# Patient Record
Sex: FEMALE | Race: BLACK OR AFRICAN AMERICAN | NOT HISPANIC OR LATINO | Employment: UNEMPLOYED | URBAN - METROPOLITAN AREA
[De-identification: names, ages, dates, MRNs, and addresses within clinical notes are randomized per-mention and may not be internally consistent; named-entity substitution may affect disease eponyms.]

---

## 2022-12-01 LAB
BASOPHILS # BLD AUTO: 0.02 10*3/MM3 (ref 0–0.2)
BASOPHILS NFR BLD AUTO: 0.2 % (ref 0–1.5)
BILIRUB UR QL STRIP: NEGATIVE
CLARITY UR: CLEAR
COLOR UR: YELLOW
DEPRECATED RDW RBC AUTO: 44.1 FL (ref 37–54)
EOSINOPHIL # BLD AUTO: 0.12 10*3/MM3 (ref 0–0.4)
EOSINOPHIL NFR BLD AUTO: 1.3 % (ref 0.3–6.2)
ERYTHROCYTE [DISTWIDTH] IN BLOOD BY AUTOMATED COUNT: 13.4 % (ref 12.3–15.4)
GLUCOSE BLDC GLUCOMTR-MCNC: 537 MG/DL (ref 70–130)
GLUCOSE UR STRIP-MCNC: ABNORMAL MG/DL
HCT VFR BLD AUTO: 36.8 % (ref 34–46.6)
HGB BLD-MCNC: 11.3 G/DL (ref 12–15.9)
HGB UR QL STRIP.AUTO: NEGATIVE
IMM GRANULOCYTES # BLD AUTO: 0.04 10*3/MM3 (ref 0–0.05)
IMM GRANULOCYTES NFR BLD AUTO: 0.4 % (ref 0–0.5)
KETONES UR QL STRIP: ABNORMAL
LEUKOCYTE ESTERASE UR QL STRIP.AUTO: NEGATIVE
LYMPHOCYTES # BLD AUTO: 1.63 10*3/MM3 (ref 0.7–3.1)
LYMPHOCYTES NFR BLD AUTO: 18.3 % (ref 19.6–45.3)
MCH RBC QN AUTO: 27.8 PG (ref 26.6–33)
MCHC RBC AUTO-ENTMCNC: 30.7 G/DL (ref 31.5–35.7)
MCV RBC AUTO: 90.4 FL (ref 79–97)
MONOCYTES # BLD AUTO: 0.67 10*3/MM3 (ref 0.1–0.9)
MONOCYTES NFR BLD AUTO: 7.5 % (ref 5–12)
NEUTROPHILS NFR BLD AUTO: 6.43 10*3/MM3 (ref 1.7–7)
NEUTROPHILS NFR BLD AUTO: 72.3 % (ref 42.7–76)
NITRITE UR QL STRIP: NEGATIVE
NRBC BLD AUTO-RTO: 0 /100 WBC (ref 0–0.2)
PH UR STRIP.AUTO: 5.5 [PH] (ref 5–8)
PLATELET # BLD AUTO: 237 10*3/MM3 (ref 140–450)
PMV BLD AUTO: 12.3 FL (ref 6–12)
PROT UR QL STRIP: ABNORMAL
RBC # BLD AUTO: 4.07 10*6/MM3 (ref 3.77–5.28)
SP GR UR STRIP: 1.02 (ref 1–1.03)
UROBILINOGEN UR QL STRIP: ABNORMAL
WBC NRBC COR # BLD: 8.91 10*3/MM3 (ref 3.4–10.8)

## 2022-12-01 PROCEDURE — 81003 URINALYSIS AUTO W/O SCOPE: CPT | Performed by: EMERGENCY MEDICINE

## 2022-12-01 PROCEDURE — 99285 EMERGENCY DEPT VISIT HI MDM: CPT

## 2022-12-01 PROCEDURE — 83930 ASSAY OF BLOOD OSMOLALITY: CPT | Performed by: EMERGENCY MEDICINE

## 2022-12-01 PROCEDURE — 83735 ASSAY OF MAGNESIUM: CPT | Performed by: EMERGENCY MEDICINE

## 2022-12-01 PROCEDURE — 83036 HEMOGLOBIN GLYCOSYLATED A1C: CPT | Performed by: EMERGENCY MEDICINE

## 2022-12-01 PROCEDURE — 82962 GLUCOSE BLOOD TEST: CPT

## 2022-12-01 PROCEDURE — 80053 COMPREHEN METABOLIC PANEL: CPT | Performed by: EMERGENCY MEDICINE

## 2022-12-01 PROCEDURE — 81001 URINALYSIS AUTO W/SCOPE: CPT | Performed by: EMERGENCY MEDICINE

## 2022-12-01 PROCEDURE — 84484 ASSAY OF TROPONIN QUANT: CPT | Performed by: EMERGENCY MEDICINE

## 2022-12-01 PROCEDURE — 84100 ASSAY OF PHOSPHORUS: CPT | Performed by: EMERGENCY MEDICINE

## 2022-12-01 PROCEDURE — 85025 COMPLETE CBC W/AUTO DIFF WBC: CPT | Performed by: EMERGENCY MEDICINE

## 2022-12-01 RX ORDER — SODIUM CHLORIDE 0.9 % (FLUSH) 0.9 %
10 SYRINGE (ML) INJECTION AS NEEDED
Status: DISCONTINUED | OUTPATIENT
Start: 2022-12-01 | End: 2022-12-08 | Stop reason: HOSPADM

## 2022-12-02 ENCOUNTER — HOSPITAL ENCOUNTER (INPATIENT)
Facility: HOSPITAL | Age: 82
LOS: 6 days | Discharge: HOME | End: 2022-12-08
Attending: EMERGENCY MEDICINE | Admitting: INTERNAL MEDICINE

## 2022-12-02 ENCOUNTER — APPOINTMENT (OUTPATIENT)
Dept: GENERAL RADIOLOGY | Facility: HOSPITAL | Age: 82
End: 2022-12-02

## 2022-12-02 DIAGNOSIS — N28.9 RENAL INSUFFICIENCY: ICD-10-CM

## 2022-12-02 DIAGNOSIS — R53.1 GENERALIZED WEAKNESS: ICD-10-CM

## 2022-12-02 DIAGNOSIS — E11.10 DIABETIC KETOACIDOSIS WITHOUT COMA ASSOCIATED WITH TYPE 2 DIABETES MELLITUS: Primary | ICD-10-CM

## 2022-12-02 DIAGNOSIS — E87.1 HYPONATREMIA: ICD-10-CM

## 2022-12-02 LAB
ALBUMIN SERPL-MCNC: 4.4 G/DL (ref 3.5–5.2)
ALBUMIN/GLOB SERPL: 1.2 G/DL
ALP SERPL-CCNC: 95 U/L (ref 39–117)
ALT SERPL W P-5'-P-CCNC: 24 U/L (ref 1–33)
ANION GAP SERPL CALCULATED.3IONS-SCNC: 10.4 MMOL/L (ref 5–15)
ANION GAP SERPL CALCULATED.3IONS-SCNC: 12.4 MMOL/L (ref 5–15)
ANION GAP SERPL CALCULATED.3IONS-SCNC: 17.2 MMOL/L (ref 5–15)
ANION GAP SERPL CALCULATED.3IONS-SCNC: 21 MMOL/L (ref 5–15)
AST SERPL-CCNC: 21 U/L (ref 1–32)
ATMOSPHERIC PRESS: 758.2 MMHG
BACTERIA UR QL AUTO: NORMAL /HPF
BASE EXCESS BLDV CALC-SCNC: -11.7 MMOL/L (ref -2–2)
BDY SITE: ABNORMAL
BILIRUB SERPL-MCNC: 0.2 MG/DL (ref 0–1.2)
BUN SERPL-MCNC: 28 MG/DL (ref 8–23)
BUN SERPL-MCNC: 36 MG/DL (ref 8–23)
BUN SERPL-MCNC: 43 MG/DL (ref 8–23)
BUN SERPL-MCNC: 48 MG/DL (ref 8–23)
BUN/CREAT SERPL: 16.9 (ref 7–25)
BUN/CREAT SERPL: 19.2 (ref 7–25)
BUN/CREAT SERPL: 20.9 (ref 7–25)
BUN/CREAT SERPL: 21.7 (ref 7–25)
CALCIUM SPEC-SCNC: 10.1 MG/DL (ref 8.6–10.5)
CALCIUM SPEC-SCNC: 10.2 MG/DL (ref 8.6–10.5)
CALCIUM SPEC-SCNC: 8.8 MG/DL (ref 8.6–10.5)
CALCIUM SPEC-SCNC: 9.1 MG/DL (ref 8.6–10.5)
CHLORIDE SERPL-SCNC: 103 MMOL/L (ref 98–107)
CHLORIDE SERPL-SCNC: 105 MMOL/L (ref 98–107)
CHLORIDE SERPL-SCNC: 86 MMOL/L (ref 98–107)
CHLORIDE SERPL-SCNC: 93 MMOL/L (ref 98–107)
CO2 SERPL-SCNC: 14 MMOL/L (ref 22–29)
CO2 SERPL-SCNC: 16.8 MMOL/L (ref 22–29)
CO2 SERPL-SCNC: 19.6 MMOL/L (ref 22–29)
CO2 SERPL-SCNC: 20.6 MMOL/L (ref 22–29)
CREAT SERPL-MCNC: 1.66 MG/DL (ref 0.57–1)
CREAT SERPL-MCNC: 1.66 MG/DL (ref 0.57–1)
CREAT SERPL-MCNC: 2.24 MG/DL (ref 0.57–1)
CREAT SERPL-MCNC: 2.3 MG/DL (ref 0.57–1)
EGFRCR SERPLBLD CKD-EPI 2021: 20.6 ML/MIN/1.73
EGFRCR SERPLBLD CKD-EPI 2021: 21.3 ML/MIN/1.73
EGFRCR SERPLBLD CKD-EPI 2021: 30.5 ML/MIN/1.73
EGFRCR SERPLBLD CKD-EPI 2021: 30.5 ML/MIN/1.73
GLOBULIN UR ELPH-MCNC: 3.6 GM/DL
GLUCOSE BLDC GLUCOMTR-MCNC: 105 MG/DL (ref 70–130)
GLUCOSE BLDC GLUCOMTR-MCNC: 108 MG/DL (ref 70–130)
GLUCOSE BLDC GLUCOMTR-MCNC: 122 MG/DL (ref 70–130)
GLUCOSE BLDC GLUCOMTR-MCNC: 140 MG/DL (ref 70–130)
GLUCOSE BLDC GLUCOMTR-MCNC: 187 MG/DL (ref 70–130)
GLUCOSE BLDC GLUCOMTR-MCNC: 205 MG/DL (ref 70–130)
GLUCOSE BLDC GLUCOMTR-MCNC: 233 MG/DL (ref 70–130)
GLUCOSE BLDC GLUCOMTR-MCNC: 247 MG/DL (ref 70–130)
GLUCOSE BLDC GLUCOMTR-MCNC: 280 MG/DL (ref 70–130)
GLUCOSE BLDC GLUCOMTR-MCNC: 289 MG/DL (ref 70–130)
GLUCOSE BLDC GLUCOMTR-MCNC: 290 MG/DL (ref 70–130)
GLUCOSE BLDC GLUCOMTR-MCNC: 319 MG/DL (ref 70–130)
GLUCOSE BLDC GLUCOMTR-MCNC: 426 MG/DL (ref 70–130)
GLUCOSE BLDC GLUCOMTR-MCNC: 488 MG/DL (ref 70–130)
GLUCOSE BLDC GLUCOMTR-MCNC: 524 MG/DL (ref 70–130)
GLUCOSE BLDC GLUCOMTR-MCNC: 96 MG/DL (ref 70–130)
GLUCOSE SERPL-MCNC: 129 MG/DL (ref 65–99)
GLUCOSE SERPL-MCNC: 134 MG/DL (ref 65–99)
GLUCOSE SERPL-MCNC: 317 MG/DL (ref 65–99)
GLUCOSE SERPL-MCNC: 543 MG/DL (ref 65–99)
HBA1C MFR BLD: 12 % (ref 4.8–5.6)
HCO3 BLDV-SCNC: 14.2 MMOL/L (ref 22–28)
HOLD SPECIMEN: NORMAL
HOLD SPECIMEN: NORMAL
HYALINE CASTS UR QL AUTO: NORMAL /LPF
MAGNESIUM SERPL-MCNC: 2.1 MG/DL (ref 1.6–2.4)
MAGNESIUM SERPL-MCNC: 2.1 MG/DL (ref 1.6–2.4)
MAGNESIUM SERPL-MCNC: 2.4 MG/DL (ref 1.6–2.4)
MAGNESIUM SERPL-MCNC: 2.4 MG/DL (ref 1.6–2.4)
MAGNESIUM SERPL-MCNC: NORMAL MG/DL
MODALITY: ABNORMAL
OSMOLALITY SERPL: 315 MOSM/KG (ref 280–301)
PCO2 BLDV: 31.8 MM HG (ref 41–51)
PH BLDV: 7.26 PH UNITS (ref 7.31–7.41)
PHOSPHATE SERPL-MCNC: 2.8 MG/DL (ref 2.5–4.5)
PHOSPHATE SERPL-MCNC: 3.7 MG/DL (ref 2.5–4.5)
PO2 BLDV: 33.1 MM HG (ref 35–45)
POTASSIUM SERPL-SCNC: 4.2 MMOL/L (ref 3.5–5.2)
POTASSIUM SERPL-SCNC: 4.6 MMOL/L (ref 3.5–5.2)
POTASSIUM SERPL-SCNC: 5.1 MMOL/L (ref 3.5–5.2)
POTASSIUM SERPL-SCNC: 5.6 MMOL/L (ref 3.5–5.2)
PROT SERPL-MCNC: 8 G/DL (ref 6–8.5)
QT INTERVAL: 369 MS
RBC # UR STRIP: NORMAL /HPF
REF LAB TEST METHOD: NORMAL
SAO2 % BLDCOA: 55.6 % (ref 92–99)
SODIUM SERPL-SCNC: 121 MMOL/L (ref 136–145)
SODIUM SERPL-SCNC: 127 MMOL/L (ref 136–145)
SODIUM SERPL-SCNC: 135 MMOL/L (ref 136–145)
SODIUM SERPL-SCNC: 136 MMOL/L (ref 136–145)
SQUAMOUS #/AREA URNS HPF: NORMAL /HPF
TROPONIN T SERPL-MCNC: 0.03 NG/ML (ref 0–0.03)
WBC # UR STRIP: NORMAL /HPF
WHOLE BLOOD HOLD COAG: NORMAL
WHOLE BLOOD HOLD SPECIMEN: NORMAL

## 2022-12-02 PROCEDURE — 83735 ASSAY OF MAGNESIUM: CPT | Performed by: INTERNAL MEDICINE

## 2022-12-02 PROCEDURE — 93010 ELECTROCARDIOGRAM REPORT: CPT | Performed by: INTERNAL MEDICINE

## 2022-12-02 PROCEDURE — 80048 BASIC METABOLIC PNL TOTAL CA: CPT | Performed by: EMERGENCY MEDICINE

## 2022-12-02 PROCEDURE — 36415 COLL VENOUS BLD VENIPUNCTURE: CPT

## 2022-12-02 PROCEDURE — 84100 ASSAY OF PHOSPHORUS: CPT | Performed by: EMERGENCY MEDICINE

## 2022-12-02 PROCEDURE — 82962 GLUCOSE BLOOD TEST: CPT

## 2022-12-02 PROCEDURE — 63710000001 INSULIN REGULAR HUMAN PER 5 UNITS: Performed by: EMERGENCY MEDICINE

## 2022-12-02 PROCEDURE — 63710000001 INSULIN LISPRO (HUMAN) PER 5 UNITS

## 2022-12-02 PROCEDURE — 93005 ELECTROCARDIOGRAM TRACING: CPT | Performed by: EMERGENCY MEDICINE

## 2022-12-02 PROCEDURE — 25010000002 POTASSIUM CHLORIDE PER 2 MEQ OF POTASSIUM: Performed by: EMERGENCY MEDICINE

## 2022-12-02 PROCEDURE — 83735 ASSAY OF MAGNESIUM: CPT | Performed by: EMERGENCY MEDICINE

## 2022-12-02 PROCEDURE — 25010000002 SODIUM CHLORIDE 0.9 % WITH KCL 20 MEQ 20-0.9 MEQ/L-% SOLUTION: Performed by: EMERGENCY MEDICINE

## 2022-12-02 PROCEDURE — 82803 BLOOD GASES ANY COMBINATION: CPT

## 2022-12-02 PROCEDURE — 71045 X-RAY EXAM CHEST 1 VIEW: CPT

## 2022-12-02 RX ORDER — DEXTROSE MONOHYDRATE 25 G/50ML
10-50 INJECTION, SOLUTION INTRAVENOUS
Status: DISCONTINUED | OUTPATIENT
Start: 2022-12-02 | End: 2022-12-02

## 2022-12-02 RX ORDER — SODIUM CHLORIDE 0.9 % (FLUSH) 0.9 %
10 SYRINGE (ML) INJECTION AS NEEDED
Status: DISCONTINUED | OUTPATIENT
Start: 2022-12-02 | End: 2022-12-02

## 2022-12-02 RX ORDER — SODIUM CHLORIDE AND POTASSIUM CHLORIDE 150; 450 MG/100ML; MG/100ML
250 INJECTION, SOLUTION INTRAVENOUS CONTINUOUS PRN
Status: DISCONTINUED | OUTPATIENT
Start: 2022-12-02 | End: 2022-12-02

## 2022-12-02 RX ORDER — SODIUM CHLORIDE 0.9 % (FLUSH) 0.9 %
10 SYRINGE (ML) INJECTION AS NEEDED
Status: DISCONTINUED | OUTPATIENT
Start: 2022-12-02 | End: 2022-12-08 | Stop reason: HOSPADM

## 2022-12-02 RX ORDER — DEXTROSE AND SODIUM CHLORIDE 5; .9 G/100ML; G/100ML
150 INJECTION, SOLUTION INTRAVENOUS CONTINUOUS PRN
Status: DISCONTINUED | OUTPATIENT
Start: 2022-12-02 | End: 2022-12-02

## 2022-12-02 RX ORDER — GUAIFENESIN 600 MG/1
1200 TABLET, EXTENDED RELEASE ORAL 2 TIMES DAILY
COMMUNITY
End: 2022-12-08 | Stop reason: HOSPADM

## 2022-12-02 RX ORDER — DEXTROSE, SODIUM CHLORIDE, AND POTASSIUM CHLORIDE 5; .9; .15 G/100ML; G/100ML; G/100ML
150 INJECTION INTRAVENOUS CONTINUOUS PRN
Status: DISCONTINUED | OUTPATIENT
Start: 2022-12-02 | End: 2022-12-02

## 2022-12-02 RX ORDER — ACETAMINOPHEN 325 MG/1
650 TABLET ORAL EVERY 4 HOURS PRN
Status: DISCONTINUED | OUTPATIENT
Start: 2022-12-02 | End: 2022-12-08 | Stop reason: HOSPADM

## 2022-12-02 RX ORDER — SODIUM CHLORIDE 9 MG/ML
40 INJECTION, SOLUTION INTRAVENOUS AS NEEDED
Status: DISCONTINUED | OUTPATIENT
Start: 2022-12-02 | End: 2022-12-02

## 2022-12-02 RX ORDER — OMEPRAZOLE 20 MG/1
20 CAPSULE, DELAYED RELEASE ORAL DAILY
COMMUNITY
End: 2022-12-08 | Stop reason: HOSPADM

## 2022-12-02 RX ORDER — HYDRALAZINE HYDROCHLORIDE 50 MG/1
50 TABLET, FILM COATED ORAL 3 TIMES DAILY
COMMUNITY

## 2022-12-02 RX ORDER — SODIUM CHLORIDE 9 MG/ML
40 INJECTION, SOLUTION INTRAVENOUS AS NEEDED
Status: DISCONTINUED | OUTPATIENT
Start: 2022-12-02 | End: 2022-12-08 | Stop reason: HOSPADM

## 2022-12-02 RX ORDER — CETIRIZINE HYDROCHLORIDE 10 MG/1
10 TABLET ORAL DAILY
COMMUNITY
End: 2022-12-08 | Stop reason: HOSPADM

## 2022-12-02 RX ORDER — DEXTROSE MONOHYDRATE 25 G/50ML
25 INJECTION, SOLUTION INTRAVENOUS
Status: DISCONTINUED | OUTPATIENT
Start: 2022-12-02 | End: 2022-12-08 | Stop reason: HOSPADM

## 2022-12-02 RX ORDER — DEXTROSE AND SODIUM CHLORIDE 5; .45 G/100ML; G/100ML
150 INJECTION, SOLUTION INTRAVENOUS CONTINUOUS PRN
Status: DISCONTINUED | OUTPATIENT
Start: 2022-12-02 | End: 2022-12-02

## 2022-12-02 RX ORDER — POTASSIUM CHLORIDE, DEXTROSE MONOHYDRATE AND SODIUM CHLORIDE 300; 5; 900 MG/100ML; G/100ML; MG/100ML
150 INJECTION, SOLUTION INTRAVENOUS CONTINUOUS PRN
Status: DISCONTINUED | OUTPATIENT
Start: 2022-12-02 | End: 2022-12-02

## 2022-12-02 RX ORDER — ATORVASTATIN CALCIUM 10 MG/1
10 TABLET, FILM COATED ORAL NIGHTLY
COMMUNITY

## 2022-12-02 RX ORDER — HYDRALAZINE HYDROCHLORIDE 50 MG/1
50 TABLET, FILM COATED ORAL 3 TIMES DAILY
Status: DISCONTINUED | OUTPATIENT
Start: 2022-12-02 | End: 2022-12-08 | Stop reason: HOSPADM

## 2022-12-02 RX ORDER — ONDANSETRON 2 MG/ML
4 INJECTION INTRAMUSCULAR; INTRAVENOUS EVERY 6 HOURS PRN
Status: DISCONTINUED | OUTPATIENT
Start: 2022-12-02 | End: 2022-12-08 | Stop reason: HOSPADM

## 2022-12-02 RX ORDER — INSULIN GLARGINE 100 [IU]/ML
20 INJECTION, SOLUTION SUBCUTANEOUS 2 TIMES DAILY
Status: ON HOLD | COMMUNITY
End: 2022-12-08 | Stop reason: SDUPTHER

## 2022-12-02 RX ORDER — ONDANSETRON 4 MG/1
4 TABLET, FILM COATED ORAL EVERY 6 HOURS PRN
Status: DISCONTINUED | OUTPATIENT
Start: 2022-12-02 | End: 2022-12-08 | Stop reason: HOSPADM

## 2022-12-02 RX ORDER — ACETAMINOPHEN 650 MG/1
650 SUPPOSITORY RECTAL EVERY 4 HOURS PRN
Status: DISCONTINUED | OUTPATIENT
Start: 2022-12-02 | End: 2022-12-08 | Stop reason: HOSPADM

## 2022-12-02 RX ORDER — NICOTINE POLACRILEX 4 MG
15 LOZENGE BUCCAL
Status: DISCONTINUED | OUTPATIENT
Start: 2022-12-02 | End: 2022-12-08 | Stop reason: HOSPADM

## 2022-12-02 RX ORDER — METOPROLOL SUCCINATE 50 MG/1
50 TABLET, EXTENDED RELEASE ORAL DAILY
COMMUNITY
End: 2022-12-08 | Stop reason: HOSPADM

## 2022-12-02 RX ORDER — NICOTINE POLACRILEX 4 MG
15 LOZENGE BUCCAL
Status: DISCONTINUED | OUTPATIENT
Start: 2022-12-02 | End: 2022-12-02

## 2022-12-02 RX ORDER — SODIUM CHLORIDE 0.9 % (FLUSH) 0.9 %
10 SYRINGE (ML) INJECTION EVERY 12 HOURS SCHEDULED
Status: DISCONTINUED | OUTPATIENT
Start: 2022-12-02 | End: 2022-12-08 | Stop reason: HOSPADM

## 2022-12-02 RX ORDER — SODIUM CHLORIDE AND POTASSIUM CHLORIDE 300; 900 MG/100ML; MG/100ML
250 INJECTION, SOLUTION INTRAVENOUS CONTINUOUS PRN
Status: DISCONTINUED | OUTPATIENT
Start: 2022-12-02 | End: 2022-12-02

## 2022-12-02 RX ORDER — SODIUM CHLORIDE 9 MG/ML
250 INJECTION, SOLUTION INTRAVENOUS CONTINUOUS PRN
Status: DISCONTINUED | OUTPATIENT
Start: 2022-12-02 | End: 2022-12-02

## 2022-12-02 RX ORDER — DEXTROSE, SODIUM CHLORIDE, AND POTASSIUM CHLORIDE 5; .45; .3 G/100ML; G/100ML; G/100ML
150 INJECTION INTRAVENOUS CONTINUOUS PRN
Status: DISCONTINUED | OUTPATIENT
Start: 2022-12-02 | End: 2022-12-02

## 2022-12-02 RX ORDER — AMLODIPINE BESYLATE 10 MG/1
10 TABLET ORAL DAILY
Status: ON HOLD | COMMUNITY
End: 2022-12-02

## 2022-12-02 RX ORDER — DEXTROSE, SODIUM CHLORIDE, AND POTASSIUM CHLORIDE 5; .45; .15 G/100ML; G/100ML; G/100ML
150 INJECTION INTRAVENOUS CONTINUOUS PRN
Status: DISCONTINUED | OUTPATIENT
Start: 2022-12-02 | End: 2022-12-02

## 2022-12-02 RX ORDER — INSULIN LISPRO 100 [IU]/ML
0-14 INJECTION, SOLUTION INTRAVENOUS; SUBCUTANEOUS
Status: DISCONTINUED | OUTPATIENT
Start: 2022-12-02 | End: 2022-12-03

## 2022-12-02 RX ORDER — SODIUM CHLORIDE AND POTASSIUM CHLORIDE 150; 900 MG/100ML; MG/100ML
250 INJECTION, SOLUTION INTRAVENOUS CONTINUOUS PRN
Status: DISCONTINUED | OUTPATIENT
Start: 2022-12-02 | End: 2022-12-02

## 2022-12-02 RX ORDER — INSULIN ASPART 100 [IU]/ML
6 INJECTION, SOLUTION INTRAVENOUS; SUBCUTANEOUS
Status: ON HOLD | COMMUNITY
End: 2022-12-08 | Stop reason: SDUPTHER

## 2022-12-02 RX ORDER — SODIUM CHLORIDE 0.9 % (FLUSH) 0.9 %
10 SYRINGE (ML) INJECTION EVERY 12 HOURS SCHEDULED
Status: DISCONTINUED | OUTPATIENT
Start: 2022-12-02 | End: 2022-12-02

## 2022-12-02 RX ORDER — ASPIRIN 81 MG/1
81 TABLET ORAL DAILY
COMMUNITY

## 2022-12-02 RX ORDER — SODIUM CHLORIDE 450 MG/100ML
250 INJECTION, SOLUTION INTRAVENOUS CONTINUOUS PRN
Status: DISCONTINUED | OUTPATIENT
Start: 2022-12-02 | End: 2022-12-02

## 2022-12-02 RX ADMIN — SODIUM CHLORIDE 1000 ML/HR: 9 INJECTION, SOLUTION INTRAVENOUS at 02:01

## 2022-12-02 RX ADMIN — INSULIN HUMAN 10 UNITS: 100 INJECTION, SOLUTION PARENTERAL at 01:48

## 2022-12-02 RX ADMIN — HYDRALAZINE HYDROCHLORIDE 50 MG: 50 TABLET, FILM COATED ORAL at 20:38

## 2022-12-02 RX ADMIN — SODIUM CHLORIDE 250 ML/HR: 4.5 INJECTION, SOLUTION INTRAVENOUS at 10:26

## 2022-12-02 RX ADMIN — INSULIN GLARGINE-YFGN 20 UNITS: 100 INJECTION, SOLUTION SUBCUTANEOUS at 09:48

## 2022-12-02 RX ADMIN — POTASSIUM CHLORIDE AND SODIUM CHLORIDE 250 ML/HR: 900; 150 INJECTION, SOLUTION INTRAVENOUS at 05:39

## 2022-12-02 RX ADMIN — POTASSIUM CHLORIDE 250 ML/HR: 2 INJECTION, SOLUTION, CONCENTRATE INTRAVENOUS at 10:44

## 2022-12-02 RX ADMIN — SODIUM CHLORIDE 1000 ML: 9 INJECTION, SOLUTION INTRAVENOUS at 01:25

## 2022-12-02 RX ADMIN — INSULIN GLARGINE-YFGN 20 UNITS: 100 INJECTION, SOLUTION SUBCUTANEOUS at 20:38

## 2022-12-02 RX ADMIN — INSULIN LISPRO 5 UNITS: 100 INJECTION, SOLUTION INTRAVENOUS; SUBCUTANEOUS at 18:39

## 2022-12-02 RX ADMIN — Medication 10 ML: at 04:23

## 2022-12-02 RX ADMIN — INSULIN HUMAN 4.3 UNITS/HR: 1 INJECTION, SOLUTION INTRAVENOUS at 02:23

## 2022-12-02 RX ADMIN — Medication 10 ML: at 20:38

## 2022-12-02 NOTE — PROGRESS NOTES
Anion gap closed and serum bicarb improved.  Blood sugar improved as well.  Therefore we will administer subcu long-acting insulin and stop insulin drip 2 hours later and allow diabetic diet and transfer the patient to the floor.  Discussed with her nurse Thuan.  Consult medicine team for management of uncontrolled diabetes and to take over medical care as patient does not require critical care.

## 2022-12-02 NOTE — PLAN OF CARE
Goal Outcome Evaluation:               Pt admitted to floor with family friend at bedside.  VSS, , on room air.  Attending MD notified for SSI orders and IVF orders.  All needs met this shift.

## 2022-12-02 NOTE — ED PROVIDER NOTES
" EMERGENCY DEPARTMENT ENCOUNTER    Room Number:  32/32  Date of encounter:  12/2/2022  PCP: Margoth Worrell APRN  Historian: Patient, family (acting as )    I used full protective equipment while examining this patient.  This includes face mask, gloves and protective eyewear.  I washed my hands before entering the room and immediately upon leaving the room.  Patient was wearing a surgical mask.      HPI:  Chief Complaint: Elevated blood sugar  A complete HPI/ROS/PMH/PSH/SH/FH are unobtainable due to: None    Context: Dedra Marlow is a 82 y.o. female, with a history of diabetes, who presents to the ED c/o elevated blood sugar.  Family reports patient's blood sugar has been intermittently elevated for the past several weeks.  When she checked it earlier tonight, her glucometer just read \"high\".  Patient saw her PCP 2 weeks ago and her insulin dose was increased to 20 units in the morning and 20 units at night and 8 units with each meal.  She then saw her PCP again 2 days ago and insulin was increased to 25 units in the morning and at night and 10 units with each meal.  Patient reports increased fatigue, generalized weakness, and nausea..  Denies fever, chills, chest pain, shortness of breath, cough, abdominal pain, vomiting, diarrhea, or dysuria.      PAST MEDICAL HISTORY  Active Ambulatory Problems     Diagnosis Date Noted   • No Active Ambulatory Problems     Resolved Ambulatory Problems     Diagnosis Date Noted   • No Resolved Ambulatory Problems     No Additional Past Medical History         PAST SURGICAL HISTORY  History reviewed. No pertinent surgical history.      FAMILY HISTORY  History reviewed. No pertinent family history.      SOCIAL HISTORY  Social History     Socioeconomic History   • Marital status:          ALLERGIES  Patient has no known allergies.       REVIEW OF SYSTEMS  Review of Systems      All systems have been reviewed and are negative except as as discussed " in the HPI    PHYSICAL EXAM    I have reviewed the triage vital signs and nursing notes.    ED Triage Vitals [12/01/22 2623]   Temp Heart Rate Resp BP SpO2   97.4 °F (36.3 °C) 99 18 (!) 190/89 97 %      Temp src Heart Rate Source Patient Position BP Location FiO2 (%)   Tympanic -- -- -- --       Physical Exam  GENERAL: Awake, alert, oriented x3.  Well-developed, well-nourished elderly female.  Resting comfortably no acute distress  HENT: NCAT, nares patent, mildly dry mucous membranes  EYES: no scleral icterus  CV: regular rhythm, regular rate  RESPIRATORY: normal effort, clear to auscultation bilaterally  ABDOMEN: soft, nontender  MUSCULOSKELETAL: Extremities are nontender and without obvious deformity.  There is no calf tenderness or pedal edema  NEURO: No facial droop.  Follows commands.  Normal strength in all extremities.  SKIN: warm, dry, no rash  PSYCH: Normal mood and affect      LAB RESULTS  Recent Results (from the past 24 hour(s))   POC Glucose Once    Collection Time: 12/01/22 10:57 PM    Specimen: Blood   Result Value Ref Range    Glucose 537 (C) 70 - 130 mg/dL   Comprehensive Metabolic Panel    Collection Time: 12/01/22 11:04 PM    Specimen: Arm, Left; Blood   Result Value Ref Range    Glucose 543 (C) 65 - 99 mg/dL    BUN 48 (H) 8 - 23 mg/dL    Creatinine 2.30 (H) 0.57 - 1.00 mg/dL    Sodium 121 (L) 136 - 145 mmol/L    Potassium 5.6 (H) 3.5 - 5.2 mmol/L    Chloride 86 (L) 98 - 107 mmol/L    CO2 14.0 (L) 22.0 - 29.0 mmol/L    Calcium 10.2 8.6 - 10.5 mg/dL    Total Protein 8.0 6.0 - 8.5 g/dL    Albumin 4.40 3.50 - 5.20 g/dL    ALT (SGPT) 24 1 - 33 U/L    AST (SGOT) 21 1 - 32 U/L    Alkaline Phosphatase 95 39 - 117 U/L    Total Bilirubin 0.2 0.0 - 1.2 mg/dL    Globulin 3.6 gm/dL    A/G Ratio 1.2 g/dL    BUN/Creatinine Ratio 20.9 7.0 - 25.0    Anion Gap 21.0 (H) 5.0 - 15.0 mmol/L    eGFR 20.6 (L) >60.0 mL/min/1.73   Green Top (Gel)    Collection Time: 12/01/22 11:04 PM   Result Value Ref Range    Extra  Tube Hold for add-ons.    Lavender Top    Collection Time: 12/01/22 11:04 PM   Result Value Ref Range    Extra Tube hold for add-on    Gold Top - SST    Collection Time: 12/01/22 11:04 PM   Result Value Ref Range    Extra Tube Hold for add-ons.    Light Blue Top    Collection Time: 12/01/22 11:04 PM   Result Value Ref Range    Extra Tube Hold for add-ons.    CBC Auto Differential    Collection Time: 12/01/22 11:04 PM    Specimen: Arm, Left; Blood   Result Value Ref Range    WBC 8.91 3.40 - 10.80 10*3/mm3    RBC 4.07 3.77 - 5.28 10*6/mm3    Hemoglobin 11.3 (L) 12.0 - 15.9 g/dL    Hematocrit 36.8 34.0 - 46.6 %    MCV 90.4 79.0 - 97.0 fL    MCH 27.8 26.6 - 33.0 pg    MCHC 30.7 (L) 31.5 - 35.7 g/dL    RDW 13.4 12.3 - 15.4 %    RDW-SD 44.1 37.0 - 54.0 fl    MPV 12.3 (H) 6.0 - 12.0 fL    Platelets 237 140 - 450 10*3/mm3    Neutrophil % 72.3 42.7 - 76.0 %    Lymphocyte % 18.3 (L) 19.6 - 45.3 %    Monocyte % 7.5 5.0 - 12.0 %    Eosinophil % 1.3 0.3 - 6.2 %    Basophil % 0.2 0.0 - 1.5 %    Immature Grans % 0.4 0.0 - 0.5 %    Neutrophils, Absolute 6.43 1.70 - 7.00 10*3/mm3    Lymphocytes, Absolute 1.63 0.70 - 3.10 10*3/mm3    Monocytes, Absolute 0.67 0.10 - 0.90 10*3/mm3    Eosinophils, Absolute 0.12 0.00 - 0.40 10*3/mm3    Basophils, Absolute 0.02 0.00 - 0.20 10*3/mm3    Immature Grans, Absolute 0.04 0.00 - 0.05 10*3/mm3    nRBC 0.0 0.0 - 0.2 /100 WBC   Urinalysis With Microscopic If Indicated (No Culture) - Urine, Clean Catch    Collection Time: 12/01/22 11:13 PM    Specimen: Urine, Clean Catch   Result Value Ref Range    Color, UA Yellow Yellow, Straw    Appearance, UA Clear Clear    pH, UA 5.5 5.0 - 8.0    Specific Gravity, UA 1.018 1.005 - 1.030    Glucose, UA >=1000 mg/dL (3+) (A) Negative    Ketones, UA 15 mg/dL (1+) (A) Negative    Bilirubin, UA Negative Negative    Blood, UA Negative Negative    Protein,  mg/dL (2+) (A) Negative    Leuk Esterase, UA Negative Negative    Nitrite, UA Negative Negative     Urobilinogen, UA 0.2 E.U./dL 0.2 - 1.0 E.U./dL   Urinalysis, Microscopic Only - Urine, Clean Catch    Collection Time: 12/01/22 11:13 PM    Specimen: Urine, Clean Catch   Result Value Ref Range    RBC, UA 0-2 None Seen, 0-2 /HPF    WBC, UA 0-2 None Seen, 0-2 /HPF    Bacteria, UA None Seen None Seen /HPF    Squamous Epithelial Cells, UA 0-2 None Seen, 0-2 /HPF    Hyaline Casts, UA 0-2 None Seen /LPF    Methodology Manual Light Microscopy    POC Glucose Once    Collection Time: 12/02/22 12:19 AM    Specimen: Blood   Result Value Ref Range    Glucose 524 (C) 70 - 130 mg/dL   Blood Gas, Venous -    Collection Time: 12/02/22  1:44 AM    Specimen: Venous Blood   Result Value Ref Range    Site N/A     pH, Venous 7.257 (C) 7.310 - 7.410 pH Units    pCO2, Venous 31.8 (L) 41.0 - 51.0 mm Hg    pO2, Venous 33.1 (L) 35.0 - 45.0 mm Hg    HCO3, Venous 14.2 (L) 22.0 - 28.0 mmol/L    Base Excess, Venous -11.7 (L) -2.0 - 2.0 mmol/L    O2 Saturation Calculated 55.6 (L) 92.0 - 99.0 %    Barometric Pressure for Blood Gas 758.2 mmHg    Modality Room Air    POC Glucose Once    Collection Time: 12/02/22  2:04 AM    Specimen: Blood   Result Value Ref Range    Glucose 488 (C) 70 - 130 mg/dL       Ordered the above labs and independently reviewed the results.      RADIOLOGY  No Radiology Exams Resulted Within Past 24 Hours    I ordered the above noted radiological studies. Reviewed by me and discussed with radiologist.  See dictation for official radiology interpretation.      PROCEDURES  Critical Care  Performed by: Chris Dillard MD  Authorized by: Chris Dillard MD     Critical care provider statement:     Critical care time (minutes):  30    Critical care was necessary to treat or prevent imminent or life-threatening deterioration of the following conditions:  Renal failure, dehydration, endocrine crisis and metabolic crisis    Critical care was time spent personally by me on the following activities:  Development of treatment  plan with patient or surrogate, discussions with consultants, evaluation of patient's response to treatment, examination of patient, obtaining history from patient or surrogate, ordering and performing treatments and interventions, ordering and review of laboratory studies, ordering and review of radiographic studies, pulse oximetry and re-evaluation of patient's condition          MEDICATIONS GIVEN IN ER    Medications   sodium chloride 0.9 % flush 10 mL (has no administration in time range)   sodium chloride 0.9 % flush 10 mL (has no administration in time range)   sodium chloride 0.9 % flush 10 mL (has no administration in time range)   sodium chloride 0.9 % infusion 40 mL (has no administration in time range)   dextrose (GLUTOSE) oral gel 15 g (has no administration in time range)   dextrose (D50W) (25 g/50 mL) IV injection 10-50 mL (has no administration in time range)   glucagon (human recombinant) (GLUCAGEN DIAGNOSTIC) injection 1 mg (has no administration in time range)   sodium chloride 0.9 % bolus (1,000 mL/hr Intravenous New Bag 12/2/22 0201)   sodium chloride 0.9 % infusion (has no administration in time range)   sodium chloride 0.9 % with KCl 20 mEq/L infusion (has no administration in time range)   sodium chloride 0.9 % with KCl 40 mEq/L infusion (has no administration in time range)   dextrose 5 % and sodium chloride 0.9 % infusion (has no administration in time range)   dextrose 5 % and sodium chloride 0.9 % with KCl 20 mEq/L infusion (has no administration in time range)   dextrose 5 % and sodium chloride 0.9 % with KCl 40 mEq/L infusion (has no administration in time range)   sodium chloride 0.45 % infusion (has no administration in time range)   sodium chloride 0.45 % with KCl 20 mEq/L infusion (has no administration in time range)   sodium chloride 0.45 % 1,000 mL with potassium chloride 40 mEq infusion (has no administration in time range)   dextrose 5 % and sodium chloride 0.45 % infusion (has  no administration in time range)   dextrose 5 % and sodium chloride 0.45 % with KCl 20 mEq/L infusion (has no administration in time range)   dextrose 5 % and sodium chloride 0.45 % with KCl 40 mEq/L infusion (has no administration in time range)   insulin regular 1 unit/mL in 0.9% sodium chloride (Glucommander) (has no administration in time range)   sodium chloride 0.9 % bolus 1,000 mL (1,000 mL Intravenous New Bag 12/2/22 0125)   insulin regular (humuLIN R,novoLIN R) injection 10 Units (10 Units Intravenous Given 12/2/22 0148)         PROGRESS, DATA ANALYSIS, CONSULTS, AND MEDICAL DECISION MAKING    All labs have been independently reviewed by me.  All radiology studies have been reviewed by me and discussed with radiologist dictating the report.   EKG's independently viewed and interpreted by me.  I have reviewed the nurse's notes, vital signs, past medical history, and medication list.  Discussion below represents my analysis of pertinent findings related to patient's condition, differential diagnosis, treatment plan and final disposition.      ED Course as of 12/02/22 0209   Fri Dec 02, 2022   0010 Old records reviewed.  Patient does not have any prior ED visits or admissions here. [WH]   0010 Ketones, UA(!): 15 mg/dL (1+) [WH]   0010 Hemoglobin(!): 11.3 [WH]   0010 WBC: 8.91 [WH]   0010 Glucose(!!): 537 [WH]   0010 Patient's glucose and anion gap are elevated.  CO2 is low.  Venous blood gas will be obtained for further evaluation.  Patient also has renal insufficiency and is hyponatremic.  She will be started on IV fluids and given 10 units of IV insulin. [WH]   0026 BUN(!): 48 [WH]   0026 Creatinine(!): 2.30 [WH]   0026 Sodium(!): 121  Corrected sodium is 128 [WH]   0026 Potassium(!): 5.6 [WH]   0026 Anion Gap(!): 21.0 [WH]   0026 CO2(!): 14.0 [WH]   0147 Venous blood gas reviewed.  pH is 7.25 and bicarb is 14.2.  Findings are consistent with acute metabolic acidosis/DKA. [WH]   0150 DKA order set and  insulin drip will be ordered.  Call will be placed to intensivist for admission []   0156 Case discussed with Dr. Arenas and he agrees to admit the patient to the ICU.  Pertinent history, exam findings, test results, ED course, and diagnoses were discussed with him. []   0207 Chest x-ray independently viewed and interpreted by me.  Chest x-ray is negative acute []   0209 Patient presented to the ED complaining of elevated blood glucose for the past several weeks.  She had seen her PCP x2 and had her insulin dose increased.  Patient was found to be in DKA.  She also had mild hyponatremia and renal insufficiency.  Patient was given IV fluids and started on insulin drip.  Case was discussed with the intensivist and she will be admitted to the ICU.  Critical care was performed on this patient. []      ED Course User Index  [] Chris Dillard MD       AS OF 02:09 EST VITALS:    BP - 180/87  HR - 93  TEMP - 97.4 °F (36.3 °C) (Tympanic)  O2 SATS - 100%      DIAGNOSIS  Final diagnoses:   Diabetic ketoacidosis without coma associated with type 2 diabetes mellitus (HCC)   Renal insufficiency   Hyponatremia   Generalized weakness         DISPOSITION  ADMISSION    Discussed treatment plan and reason for admission with pt/family and admitting physician.  Pt/family voiced understanding of the plan for admission for further testing/treatment as needed.         Dictated utilizing Dragon dictation     Chris Dillard MD  12/02/22 0209

## 2022-12-02 NOTE — H&P
Group: [facility]         CONSULT NOTE    Patient Identification:  Dedra Marlow  82 y.o.  female  1940  [id number]                 CC: Weakness    History of Present Illness:  83 y/o female, . The entire interview was conducted in Cymro with the children at bedside answering questions  She has been diabetic for 30 years and never had DKA. Over the past 3-4 months her family doc has had difficulty controlling her blood glucose levels. She has been increasing her long acting and short acting insulin but this has not helped at all. The family is unaware why or what may have changed recently that could have caused this.  Her renal function has also been deteriorating. SHe has been referred to nephrology, but doesn't have appt till Feb.   She has not yet been referred to endocriniolgy  Denies cough, fever or sputum  Has had frequent urination and thirst  Has had DKA recently and seen at [facility]  Has hx o murmur      Review of Systems   Constitutional: Positive for fatigue. Negative for diaphoresis and fever.   HENT: Negative for ear discharge and sore throat.    Eyes: Negative for pain and visual disturbance.   Respiratory: Negative for cough and shortness of breath.    Cardiovascular: Negative for chest pain and leg swelling.   Gastrointestinal: Negative for abdominal pain and diarrhea.   Endocrine: Positive for polydipsia and polyuria. Negative for cold intolerance.   Genitourinary: Negative for dysuria and hematuria.   Musculoskeletal: Negative for joint swelling and myalgias.   Skin: Negative for rash and wound.   Neurological: Positive for weakness. Negative for speech difficulty and numbness.   Hematological: Negative for adenopathy. Does not bruise/bleed easily.   Psychiatric/Behavioral: Negative for agitation and confusion.       Past Medical History:  DMT2  Murmur  hypertension    Past Surgical History:  History reviewed. No pertinent surgical history.     Home  "Meds:  reviewed    Allergies:  No Known Allergies    Social History:   Social History     Socioeconomic History   • Marital status:    No alcohol, no drugs    Family History:  History reviewed. No pertinent family history.    Physical Exam:  /85   Pulse 88   Temp 97.4 °F (36.3 °C) (Tympanic)   Resp 18   Ht 162.6 cm (64\")   Wt 67.7 kg (149 lb 3.2 oz)   SpO2 100%   BMI 25.61 kg/m²  Body mass index is 25.61 kg/m². 100% 67.7 kg (149 lb 3.2 oz)  Physical Exam  HENT:      Right Ear: External ear normal.      Left Ear: External ear normal.      Nose: Nose normal.      Mouth/Throat:      Mouth: Mucous membranes are dry.   Eyes:      Conjunctiva/sclera: Conjunctivae normal.      Pupils: Pupils are equal, round, and reactive to light.   Neck:      Thyroid: No thyromegaly.      Vascular: No JVD.      Trachea: No tracheal deviation.   Cardiovascular:      Rate and Rhythm: Normal rate and regular rhythm.      Heart sounds: Normal heart sounds. No murmur heard.  Pulmonary:      Effort: Pulmonary effort is normal.      Breath sounds: Normal breath sounds.   Abdominal:      Palpations: Abdomen is soft.      Tenderness: There is no abdominal tenderness. There is no rebound.      Comments: Cannot palpate liver or spleen enlargement   Musculoskeletal:         General: No deformity. Normal range of motion.      Cervical back: Neck supple. No rigidity.   Skin:     General: Skin is warm.      Findings: No rash.      Comments: No palpable nodules   Neurological:      General: No focal deficit present.      Mental Status: She is alert and oriented to person, place, and time.      Cranial Nerves: No cranial nerve deficit.      Motor: No weakness.   Psychiatric:         Mood and Affect: Mood normal.         Thought Content: Thought content normal.         LABS:  No results found for: COVID19    Lab Results   Component Value Date    CALCIUM 10.2 12/01/2022    PHOS 3.7 12/01/2022     Results from last 7 days   Lab Units " 12/01/22  2304 11/25/22  1800   MAGNESIUM mg/dL 2.4  --    SODIUM mmol/L 121*  --    POTASSIUM mmol/L 5.6*  --    CHLORIDE mmol/L 86*  --    CO2 mmol/L 14.0*  --    BUN mg/dL 48*  --    CREATININE mg/dL 2.30*  --    GLUCOSE mg/dL 543*  --    CALCIUM mg/dL 10.2  --    WBC 10*3/mm3 8.91  --    HEMOGLOBIN g/dL 11.3*  --    EXTERNAL HEMOGLOBIN Gram/dL  --  11.5*   PLATELETS 10*3/mm3 237  --    ALT (SGPT) U/L 24  --    AST (SGOT) U/L 21  --      No results found for: CKTOTAL, CKMB, CKMBINDEX, TROPONINI, TROPONINT              Results from last 7 days   Lab Units 12/02/22  0144   MODALITY  Room Air   O2 SATURATION CALC % 55.6*                 No results found for: TSH  Estimated Creatinine Clearance: 17.5 mL/min (A) (by C-G formula based on SCr of 2.3 mg/dL (H)).  Results from last 7 days   Lab Units 12/01/22  2313   NITRITE UA  Negative   WBC UA /HPF 0-2   BACTERIA UA /HPF None Seen   SQUAM EPITHEL UA /HPF 0-2        Imaging: I personally visualized the images of CXR showing clear lungs. Has large aortic arch      Assessment:  Diabetic ketoacidosis without coma associated with type 2 diabetes mellitus (HCC)  Acute kidney injury  Hyperkalemia  Hyponatremia  Anemia  hypertension    Recommendations:  Admit to ICU  Start IV fluid boluses and IV insulin drip per DKA protocol  Replace electrolytes as per protocol  Monitor UOP hourly  Scan bladder q 6 hours  NPO  Diabetic education prior to DC  Repeat chemistries q4 hrs    CC time 33 minutes.    Patient was placed in face mask upon entering room and kept mask on throughout our encounter. I wore full protective equipment throughout this patient encounter including a face mask, gown and gloves. Hand hygiene was performed before donning protective equipment and after removal when leaving the room.    Milad Arenas MD  12/2/2022  04:28 EST      Much of this encounter note is an electronic transcription/translation of spoken language to printed text using Dragon Software.

## 2022-12-03 LAB
GLUCOSE BLDC GLUCOMTR-MCNC: 143 MG/DL (ref 70–130)
GLUCOSE BLDC GLUCOMTR-MCNC: 168 MG/DL (ref 70–130)
GLUCOSE BLDC GLUCOMTR-MCNC: 232 MG/DL (ref 70–130)
GLUCOSE BLDC GLUCOMTR-MCNC: 275 MG/DL (ref 70–130)
GLUCOSE BLDC GLUCOMTR-MCNC: 275 MG/DL (ref 70–130)
GLUCOSE BLDC GLUCOMTR-MCNC: 82 MG/DL (ref 70–130)

## 2022-12-03 PROCEDURE — 63710000001 INSULIN REGULAR HUMAN PER 5 UNITS: Performed by: INTERNAL MEDICINE

## 2022-12-03 PROCEDURE — 82962 GLUCOSE BLOOD TEST: CPT

## 2022-12-03 PROCEDURE — 63710000001 INSULIN LISPRO (HUMAN) PER 5 UNITS

## 2022-12-03 RX ADMIN — INSULIN LISPRO 8 UNITS: 100 INJECTION, SOLUTION INTRAVENOUS; SUBCUTANEOUS at 12:35

## 2022-12-03 RX ADMIN — Medication 10 ML: at 20:42

## 2022-12-03 RX ADMIN — HYDRALAZINE HYDROCHLORIDE 50 MG: 50 TABLET, FILM COATED ORAL at 08:22

## 2022-12-03 RX ADMIN — HYDRALAZINE HYDROCHLORIDE 50 MG: 50 TABLET, FILM COATED ORAL at 20:41

## 2022-12-03 RX ADMIN — INSULIN GLARGINE-YFGN 20 UNITS: 100 INJECTION, SOLUTION SUBCUTANEOUS at 08:22

## 2022-12-03 RX ADMIN — HYDRALAZINE HYDROCHLORIDE 50 MG: 50 TABLET, FILM COATED ORAL at 17:16

## 2022-12-03 RX ADMIN — Medication 10 ML: at 08:22

## 2022-12-03 RX ADMIN — INSULIN HUMAN 12 UNITS: 100 INJECTION, SOLUTION PARENTERAL at 17:16

## 2022-12-03 RX ADMIN — INSULIN GLARGINE-YFGN 20 UNITS: 100 INJECTION, SOLUTION SUBCUTANEOUS at 20:41

## 2022-12-03 NOTE — PROGRESS NOTES
Dr. ANAHI Arenas    [address]    12/3/2022    Patient ID:  Name:  Dedra Marlow  MRN:  [id number]  1940  82 y.o.  female            CC/Reason for visit: DKA    Interval hx:  patient.  Daughter at bedside again today.  Entire interview was conducted in Frisian.  They insist and they showed me the actual vials of long-acting Levemir, Lantus, NovoLog pen injectors that the patient was using at home.  She has been a diabetic for 30 years yet over the past 3 to 4 months she has had several episodes of DKA and uncontrollable blood glucose levels at home despite her family practitioner increasing her dosages of long-acting and short acting insulin.  She is uninsured and goes to the clinic.  Her daughter is requesting endocrinology referral  The patient's appetite has improved.  She denies any pain, nausea or fever.    ROS: No fever, no skin rashes, no blurry vision, no dizziness    Vitals:  Vitals:    12/03/22 0029 12/03/22 0427 12/03/22 0707 12/03/22 1354   BP: 150/75  139/80 165/81   BP Location: Right arm  Right arm Right arm   Patient Position: Lying  Lying Lying   Pulse: 80  77 85   Resp: 16  18 18   Temp: 98.1 °F (36.7 °C)  97.9 °F (36.6 °C) 97.5 °F (36.4 °C)   TempSrc: Oral  Oral Oral   SpO2: 100%      Weight:  66 kg (145 lb 8.1 oz)     Height:               Body mass index is 24.98 kg/m².    Intake/Output Summary (Last 24 hours) at 12/3/2022 1407  Last data filed at 12/3/2022 1300  Gross per 24 hour   Intake 360 ml   Output 3350 ml   Net -2990 ml       Exam:  GEN:  No distress  Alert, oriented x 3.   LUNGS: Clear breath sounds bilat, no use of accessory muscles  CV:  Normal S1S2, without murmur, no edema  ABD:  Non tender, no enlarged liver or masses      Scheduled meds:  hydrALAZINE, 50 mg, Oral, TID  insulin glargine, 20 Units, Subcutaneous, BID  insulin lispro, 0-14 Units, Subcutaneous, TID AC  sodium chloride, 10 mL, Intravenous, Q12H      IV meds:                            Data Review:   I reviewed the patient's medications and new clinical results.    No results found for: COVID19      Lab Results   Component Value Date    CALCIUM 8.8 12/02/2022    PHOS 2.8 12/02/2022    MG 2.1 12/02/2022    MG  12/02/2022      Comment:      Specimen contaminated   Corrected result. Previous result was 1.5 mg/dL on 12/2/2022 at 1253 EST.    MG 2.1 12/02/2022     Results from last 7 days   Lab Units 12/02/22  1321 12/02/22  0818 12/02/22  0410 12/01/22  2304   SODIUM mmol/L 136 135* 127* 121*   POTASSIUM mmol/L 5.1 4.2 4.6 5.6*   CHLORIDE mmol/L 103 105 93* 86*   CO2 mmol/L 20.6* 19.6* 16.8* 14.0*   BUN mg/dL 28* 36* 43* 48*   CREATININE mg/dL 1.66* 1.66* 2.24* 2.30*   CALCIUM mg/dL 8.8 9.1 10.1 10.2   BILIRUBIN mg/dL  --   --   --  0.2   ALK PHOS U/L  --   --   --  95   ALT (SGPT) U/L  --   --   --  24   AST (SGOT) U/L  --   --   --  21   GLUCOSE mg/dL 129* 134* 317* 543*   WBC 10*3/mm3  --   --   --  8.91   HEMOGLOBIN g/dL  --   --   --  11.3*   PLATELETS 10*3/mm3  --   --   --  237                 ASSESSMENT:   Diabetic ketoacidosis without coma associated with type 2 diabetes mellitus (HCC)  Acute kidney injury  Hyperkalemia  Hyponatremia  Anemia  hypertension      PLAN:  Patient is out of ICU.  Her appetite is improving.  We will continue to adjust her insulin for better coverage of her blood glucose.  Of note however, this patient's clinical and social situation is quite complex.  Please see above for details but the patient's family is very concerned about her recent several DKA episodes within the past 4 months.  She has been a diabetic for 30 years and knows very well how to manage her blood glucose with self injected insulin.  Despite numerous increases in dosages of long-acting and short acting insulin by her family practitioner over the past couple of months she has had 3 episodes of DKA.  There is no clear clinical evidence of infection to explain this.  I instructed the family to  bring pictures of her home insulin which is refrigerated.  They will do this on Monday.  They brought vials of her old long-acting and short acting pen injectors, Lantus, Levemir and NovoLog.  I am not sure if these are counterfeit insulin products but I have no other way to explain why she is not responding to recent increase in her insulin regimen.          Milad Arenas MD  12/3/2022

## 2022-12-03 NOTE — CONSULTS
"Nutrition Services    Patient Name:  Dedra Marlow  YOB: 1940  MRN: [id number]  Admit Date:  12/2/2022      Comment: Consult per RN admission screen - DKA    Pt admitted with elevated BG levels, nausea, fatigue; Aic 12%. Not newly diagnosed with DM2 - 30 year history. Difficulty managing BG in recent months per information provided to physician from family. Diet just advanced. Will monitor meal intake trend and address any diet education needs prior to discharge. RD to follow.     CLINICAL NUTRITION ASSESSMENT      Reason for Assessment Nurse Admission Screen, Other: DKA protocol     Diagnosis/Problem   Diabetic ketoacidosis without coma associated with type 2 diabetes mellitus   Medical/Surgical History Past Medical History:   Diagnosis Date   • Diabetes mellitus (HCC)    • Hypertension        History reviewed. No pertinent surgical history.     Encounter Information        Nutrition History:  Pt admitted on 12/2/22 in DKA. Aic 12%. Citizen of [place] speaking. Hx diabetes x 30 years. Increasing difficulty managing BG for past 3-4 months on OP basis per the family's information provided to MD. Family also provided that renal function worsening. Increased urination & thirst reported.   Food Preferences:    Supplements:    Factors Affecting Intake: Other: uncontrolled BG, nausea, fatigue     Anthropometrics        Current Height  Current Weight  BMI kg/m2 Height: 162.6 cm (64\")  Weight: 66 kg (145 lb 8.1 oz) (12/03/22 0427)  Body mass index is 24.98 kg/m².   Adjusted BMI (if applicable)        Admission Weight 149 lb       Ideal Body Weight (IBW) 120 lb   Adjusted IBW (if applicable)        Usual Body Weight (UBW) UTD   Weight Change/Trend Other: no wt hx       Weight History Wt Readings from Last 30 Encounters:   12/03/22 0427 66 kg (145 lb 8.1 oz)   12/02/22 0026 67.7 kg (149 lb 3.2 oz)           --  Estimated/Assessed Needs       Energy Requirements    Height for Calculation  Height: 162.6 cm (64\") "   Weight for Calculation 66 kg   Method for Estimation  25 kcal/kg   EST Needs (kcal/day) 1650       Protein Requirements    Weight for Calculation 66 kg   EST Protein Needs (g/kg) 1.0 gm/kg   EST Daily Needs (g/day) 66       Fluid Requirements     Method for Estimation 1 mL/kcal    Estimated Needs (mL/day) 1650             Tests/Procedures        Tests/Procedures X-Ray     Labs       Pertinent Labs    Results from last 7 days   Lab Units 12/02/22  1321 12/02/22  0818 12/02/22  0410 12/01/22  2304   SODIUM mmol/L 136 135* 127* 121*   POTASSIUM mmol/L 5.1 4.2 4.6 5.6*   CHLORIDE mmol/L 103 105 93* 86*   CO2 mmol/L 20.6* 19.6* 16.8* 14.0*   BUN mg/dL 28* 36* 43* 48*   CREATININE mg/dL 1.66* 1.66* 2.24* 2.30*   CALCIUM mg/dL 8.8 9.1 10.1 10.2   BILIRUBIN mg/dL  --   --   --  0.2   ALK PHOS U/L  --   --   --  95   ALT (SGPT) U/L  --   --   --  24   AST (SGOT) U/L  --   --   --  21   GLUCOSE mg/dL 129* 134* 317* 543*     Results from last 7 days   Lab Units 12/02/22  1328 12/02/22  1321 12/02/22  0818 12/02/22 0410 12/01/22  2304   MAGNESIUM mg/dL 2.1  --  2.1 2.4 2.4   PHOSPHORUS mg/dL  --  2.8 2.8 2.8 3.7   HEMOGLOBIN g/dL  --   --   --   --  11.3*   HEMATOCRIT %  --   --   --   --  36.8   WBC 10*3/mm3  --   --   --   --  8.91   ALBUMIN g/dL  --   --   --   --  4.40     Results from last 7 days   Lab Units 12/01/22  2304   PLATELETS 10*3/mm3 237     No results found for: COVID19  Lab Results   Component Value Date    HGBA1C 12.00 (H) 12/01/2022          Medications           Scheduled Medications hydrALAZINE, 50 mg, Oral, TID  insulin glargine, 20 Units, Subcutaneous, BID  insulin lispro, 0-14 Units, Subcutaneous, TID AC  sodium chloride, 10 mL, Intravenous, Q12H       Infusions     PRN Medications •  acetaminophen **OR** acetaminophen  •  dextrose  •  dextrose  •  glucagon (human recombinant)  •  ondansetron **OR** ondansetron  •  sodium chloride  •  sodium chloride  •  sodium chloride     Physical Findings           Physical Appearance on oxygen therapy   Oral/Mouth Cavity teeth missing   Edema  ascites   Gastrointestinal nausea, last bowel movement:11/30   Skin  skin intact   Tubes/Drains none   NFPE Not applicable at this time   --  Current Nutrition Orders & Evaluation of Intake       Oral Nutrition     Food Allergies NKFA   Current PO Diet Diet: Regular/House Diet, Diabetic Diets; Consistent Carbohydrate; Texture: Regular Texture (IDDSI 7); Fluid Consistency: Thin (IDDSI 0)   Supplement n/a   PO Evaluation     % PO Intake Insufficient data    # of Days Evaluated    --  PES STATEMENT / NUTRITION DIAGNOSIS      Nutrition Dx Problem  Problem: Altered Nutrition Related to Labs  Etiology: Medical Diagnosis DKA/type 2 diabetes  Signs/Symptoms: Biochemical Aic 12%    Comment:    --  NUTRITION INTERVENTION / PLAN OF CARE      Intervention Goal(s) Improved nutrition related labs, Establish PO intake and Tolerate PO          RD Intervention/Action Follow Tx Progress and Care plan reviewed         Prescription/Orders:       PO Diet       Supplements       Snacks       Enteral Nutrition       Parenteral Nutrition    New Prescription Ordered? No changes at this time   --      Monitor/Evaluation Per protocol, PO intake, Pertinent labs, GI status, POC/GOC   Discharge Plan/Needs Pending clinical course   Education Will instruct as appropriate   --    RD to follow per protocol.      Electronically signed by:  Marilee Montague RD  12/03/22 08:12 EST

## 2022-12-03 NOTE — PLAN OF CARE
Goal Outcome Evaluation:     Patient rested well throughout the night with her daughters at the bedside to help with communication and ADLs. She is alert, oriented, denies pain, but is weak with activity and did require 1 ltr. Of oxygen use while sleeping. No signs of edema or skin breakdown present. She is voiding well via external catheter. Lungs have coarse breath sounds bilaterally with diminished bases. VSS at this time and her accuchecks have been trending downwards.

## 2022-12-04 LAB
ANION GAP SERPL CALCULATED.3IONS-SCNC: 10 MMOL/L (ref 5–15)
BUN SERPL-MCNC: 20 MG/DL (ref 8–23)
BUN/CREAT SERPL: 12.8 (ref 7–25)
CALCIUM SPEC-SCNC: 9.4 MG/DL (ref 8.6–10.5)
CHLORIDE SERPL-SCNC: 105 MMOL/L (ref 98–107)
CO2 SERPL-SCNC: 22 MMOL/L (ref 22–29)
CREAT SERPL-MCNC: 1.56 MG/DL (ref 0.57–1)
EGFRCR SERPLBLD CKD-EPI 2021: 32.9 ML/MIN/1.73
GLUCOSE BLDC GLUCOMTR-MCNC: 130 MG/DL (ref 70–130)
GLUCOSE BLDC GLUCOMTR-MCNC: 184 MG/DL (ref 70–130)
GLUCOSE BLDC GLUCOMTR-MCNC: 295 MG/DL (ref 70–130)
GLUCOSE BLDC GLUCOMTR-MCNC: 65 MG/DL (ref 70–130)
GLUCOSE BLDC GLUCOMTR-MCNC: 93 MG/DL (ref 70–130)
GLUCOSE SERPL-MCNC: 69 MG/DL (ref 65–99)
POTASSIUM SERPL-SCNC: 4.3 MMOL/L (ref 3.5–5.2)
SODIUM SERPL-SCNC: 137 MMOL/L (ref 136–145)

## 2022-12-04 PROCEDURE — 82962 GLUCOSE BLOOD TEST: CPT

## 2022-12-04 PROCEDURE — 63710000001 INSULIN REGULAR HUMAN PER 5 UNITS: Performed by: INTERNAL MEDICINE

## 2022-12-04 PROCEDURE — 80048 BASIC METABOLIC PNL TOTAL CA: CPT | Performed by: HOSPITALIST

## 2022-12-04 RX ADMIN — HYDRALAZINE HYDROCHLORIDE 50 MG: 50 TABLET, FILM COATED ORAL at 16:58

## 2022-12-04 RX ADMIN — Medication 10 ML: at 08:57

## 2022-12-04 RX ADMIN — INSULIN HUMAN 4 UNITS: 100 INJECTION, SOLUTION PARENTERAL at 16:58

## 2022-12-04 RX ADMIN — HYDRALAZINE HYDROCHLORIDE 50 MG: 50 TABLET, FILM COATED ORAL at 08:57

## 2022-12-04 RX ADMIN — INSULIN HUMAN 12 UNITS: 100 INJECTION, SOLUTION PARENTERAL at 12:15

## 2022-12-04 RX ADMIN — Medication 10 ML: at 21:05

## 2022-12-04 RX ADMIN — HYDRALAZINE HYDROCHLORIDE 50 MG: 50 TABLET, FILM COATED ORAL at 21:04

## 2022-12-04 RX ADMIN — INSULIN GLARGINE-YFGN 20 UNITS: 100 INJECTION, SOLUTION SUBCUTANEOUS at 08:57

## 2022-12-04 NOTE — PROGRESS NOTES
Dr. ANAHI Arenas    [address]    12/4/2022    Patient ID:  Name:  Dedra Marlow  MRN:  [id number]  1940  82 y.o.  female            CC/Reason for visit: Diabetic ketoacidosis    Interval hx: Has no new complaints.  Did feel a little lightheaded this morning at 6 AM and her sugar was 65.  She received orange juice.  Subsequently it increased somewhat.  Her prelunch glucose was 295.  Her short acting insulin was held due to her morning hypoglycemia but she did receive her long-acting insulin.      ROS: No chest pain, abdominal pain    Vitals:  Vitals:    12/03/22 1935 12/03/22 2300 12/04/22 0707 12/04/22 1316   BP: 137/77 141/86 162/83 158/83   BP Location: Right arm Right arm Right arm Right arm   Patient Position: Sitting Lying Lying Sitting   Pulse: 93 80 100 82   Resp: 16 16 18 18   Temp: 98.5 °F (36.9 °C) 98 °F (36.7 °C) 98.4 °F (36.9 °C) 97.5 °F (36.4 °C)   TempSrc: Oral Oral Oral Oral   SpO2: 100% 100% 99% 100%   Weight:       Height:               Body mass index is 24.98 kg/m².    Intake/Output Summary (Last 24 hours) at 12/4/2022 1434  Last data filed at 12/4/2022 1316  Gross per 24 hour   Intake 840 ml   Output 1850 ml   Net -1010 ml       Exam:  GEN:  No distress  Alert, oriented x 3.   LUNGS: Clear breath sounds bilat, no use of accessory muscles  CV:  Normal S1S2, without murmur, no edema  ABD:  Non tender, no enlarged liver or masses      Scheduled meds:  hydrALAZINE, 50 mg, Oral, TID  insulin glargine, 20 Units, Subcutaneous, BID  insulin regular, 0-24 Units, Subcutaneous, 4x Daily AC & at Bedtime  sodium chloride, 10 mL, Intravenous, Q12H      IV meds:                           Data Review:   I reviewed the patient's medications and new clinical results.    No results found for: COVID19      Lab Results   Component Value Date    CALCIUM 9.4 12/04/2022    PHOS 2.8 12/02/2022    MG 2.1 12/02/2022    MG  12/02/2022      Comment:      Specimen contaminated   Corrected  result. Previous result was 1.5 mg/dL on 12/2/2022 at 1253 EST.    MG 2.1 12/02/2022     Results from last 7 days   Lab Units 12/04/22  0701 12/02/22  1321 12/02/22  0818 12/02/22  0410 12/01/22  2304   SODIUM mmol/L 137 136 135*   < > 121*   POTASSIUM mmol/L 4.3 5.1 4.2   < > 5.6*   CHLORIDE mmol/L 105 103 105   < > 86*   CO2 mmol/L 22.0 20.6* 19.6*   < > 14.0*   BUN mg/dL 20 28* 36*   < > 48*   CREATININE mg/dL 1.56* 1.66* 1.66*   < > 2.30*   CALCIUM mg/dL 9.4 8.8 9.1   < > 10.2   BILIRUBIN mg/dL  --   --   --   --  0.2   ALK PHOS U/L  --   --   --   --  95   ALT (SGPT) U/L  --   --   --   --  24   AST (SGOT) U/L  --   --   --   --  21   GLUCOSE mg/dL 69 129* 134*   < > 543*   WBC 10*3/mm3  --   --   --   --  8.91   HEMOGLOBIN g/dL  --   --   --   --  11.3*   PLATELETS 10*3/mm3  --   --   --   --  237    < > = values in this interval not displayed.               ASSESSMENT:     Diabetic ketoacidosis without coma associated with type 2 diabetes mellitus (HCC)  Acute kidney injury  Hyperkalemia  Hyponatremia  Anemia  Hypertension  Diabetic nephropathy      PLAN:  For some reason her diabetes has become very difficult to manage over the last 4 months.  It is unclear why.  Perhaps it is because she has had diabetes for 30 years?  Not sure.  She does have some chronic kidney disease but she has done a fairly decent job at managing her diabetes for 30 years.  Due to her chronic diabetic nephropathy over the years, it may not be advisable to dose long-acting Lantus twice a day.  I will switch to once a day long-acting Lantus dosing in the morning only, I will increase to 30 units instead of 20 units.  We will continue checking blood sugar before meals and at bedtime and treat her with sliding scale at at bedtime, and see what her morning blood sugar is at 6 AM.  Continue with high-dose sliding scale insulin for meals.  We will see how things go but she is not ready for discharge yet.        Milad Arenas MD  12/4/2022

## 2022-12-04 NOTE — PLAN OF CARE
Goal Outcome Evaluation:              Outcome Evaluation: vss, lantus adjusted to once daily, pt and family educated, toleating po, will continue to monitor

## 2022-12-05 LAB
ANION GAP SERPL CALCULATED.3IONS-SCNC: 7.5 MMOL/L (ref 5–15)
B-OH-BUTYR SERPL-SCNC: 0.23 MMOL/L (ref 0.02–0.27)
BUN SERPL-MCNC: 15 MG/DL (ref 8–23)
BUN/CREAT SERPL: 10.7 (ref 7–25)
CALCIUM SPEC-SCNC: 9 MG/DL (ref 8.6–10.5)
CHLORIDE SERPL-SCNC: 102 MMOL/L (ref 98–107)
CO2 SERPL-SCNC: 24.5 MMOL/L (ref 22–29)
CREAT SERPL-MCNC: 1.4 MG/DL (ref 0.57–1)
DEPRECATED RDW RBC AUTO: 42.1 FL (ref 37–54)
EGFRCR SERPLBLD CKD-EPI 2021: 37.4 ML/MIN/1.73
EOSINOPHIL # BLD MANUAL: 0.06 10*3/MM3 (ref 0–0.4)
EOSINOPHIL NFR BLD MANUAL: 1 % (ref 0.3–6.2)
ERYTHROCYTE [DISTWIDTH] IN BLOOD BY AUTOMATED COUNT: 13.8 % (ref 12.3–15.4)
GLUCOSE BLDC GLUCOMTR-MCNC: 101 MG/DL (ref 70–130)
GLUCOSE BLDC GLUCOMTR-MCNC: 102 MG/DL (ref 70–130)
GLUCOSE BLDC GLUCOMTR-MCNC: 240 MG/DL (ref 70–130)
GLUCOSE BLDC GLUCOMTR-MCNC: 245 MG/DL (ref 70–130)
GLUCOSE BLDC GLUCOMTR-MCNC: 310 MG/DL (ref 70–130)
GLUCOSE SERPL-MCNC: 108 MG/DL (ref 65–99)
HCT VFR BLD AUTO: 31.8 % (ref 34–46.6)
HGB BLD-MCNC: 10.7 G/DL (ref 12–15.9)
LYMPHOCYTES # BLD MANUAL: 1.64 10*3/MM3 (ref 0.7–3.1)
LYMPHOCYTES NFR BLD MANUAL: 9.1 % (ref 5–12)
MCH RBC QN AUTO: 28 PG (ref 26.6–33)
MCHC RBC AUTO-ENTMCNC: 33.6 G/DL (ref 31.5–35.7)
MCV RBC AUTO: 83.2 FL (ref 79–97)
MONOCYTES # BLD: 0.57 10*3/MM3 (ref 0.1–0.9)
NEUTROPHILS # BLD AUTO: 3.96 10*3/MM3 (ref 1.7–7)
NEUTROPHILS NFR BLD MANUAL: 63.6 % (ref 42.7–76)
PLAT MORPH BLD: NORMAL
PLATELET # BLD AUTO: 207 10*3/MM3 (ref 140–450)
PMV BLD AUTO: 11.7 FL (ref 6–12)
POTASSIUM SERPL-SCNC: 4.4 MMOL/L (ref 3.5–5.2)
RBC # BLD AUTO: 3.82 10*6/MM3 (ref 3.77–5.28)
RBC MORPH BLD: NORMAL
SODIUM SERPL-SCNC: 134 MMOL/L (ref 136–145)
VARIANT LYMPHS NFR BLD MANUAL: 26.3 % (ref 19.6–45.3)
WBC MORPH BLD: NORMAL
WBC NRBC COR # BLD: 6.22 10*3/MM3 (ref 3.4–10.8)

## 2022-12-05 PROCEDURE — 63710000001 INSULIN LISPRO (HUMAN) PER 5 UNITS: Performed by: INTERNAL MEDICINE

## 2022-12-05 PROCEDURE — 85025 COMPLETE CBC W/AUTO DIFF WBC: CPT | Performed by: INTERNAL MEDICINE

## 2022-12-05 PROCEDURE — 82962 GLUCOSE BLOOD TEST: CPT

## 2022-12-05 PROCEDURE — 85007 BL SMEAR W/DIFF WBC COUNT: CPT | Performed by: INTERNAL MEDICINE

## 2022-12-05 PROCEDURE — 97530 THERAPEUTIC ACTIVITIES: CPT

## 2022-12-05 PROCEDURE — 80048 BASIC METABOLIC PNL TOTAL CA: CPT | Performed by: INTERNAL MEDICINE

## 2022-12-05 PROCEDURE — 97161 PT EVAL LOW COMPLEX 20 MIN: CPT

## 2022-12-05 PROCEDURE — 63710000001 INSULIN REGULAR HUMAN PER 5 UNITS: Performed by: INTERNAL MEDICINE

## 2022-12-05 PROCEDURE — 82010 KETONE BODYS QUAN: CPT | Performed by: INTERNAL MEDICINE

## 2022-12-05 RX ORDER — LANCETS 30 GAUGE
EACH MISCELLANEOUS
Qty: 100 EACH | Refills: 0 | OUTPATIENT
Start: 2022-12-05

## 2022-12-05 RX ORDER — INSULIN LISPRO 100 [IU]/ML
6 INJECTION, SOLUTION INTRAVENOUS; SUBCUTANEOUS
Status: DISCONTINUED | OUTPATIENT
Start: 2022-12-05 | End: 2022-12-08

## 2022-12-05 RX ORDER — BLOOD-GLUCOSE METER
KIT MISCELLANEOUS
Qty: 1 EACH | Refills: 0 | OUTPATIENT
Start: 2022-12-05

## 2022-12-05 RX ADMIN — Medication 10 ML: at 21:21

## 2022-12-05 RX ADMIN — HYDRALAZINE HYDROCHLORIDE 50 MG: 50 TABLET, FILM COATED ORAL at 21:20

## 2022-12-05 RX ADMIN — INSULIN GLARGINE-YFGN 30 UNITS: 100 INJECTION, SOLUTION SUBCUTANEOUS at 09:13

## 2022-12-05 RX ADMIN — HYDRALAZINE HYDROCHLORIDE 50 MG: 50 TABLET, FILM COATED ORAL at 18:57

## 2022-12-05 RX ADMIN — INSULIN HUMAN 8 UNITS: 100 INJECTION, SOLUTION PARENTERAL at 11:56

## 2022-12-05 RX ADMIN — INSULIN LISPRO 6 UNITS: 100 INJECTION, SOLUTION INTRAVENOUS; SUBCUTANEOUS at 18:57

## 2022-12-05 RX ADMIN — INSULIN HUMAN 8 UNITS: 100 INJECTION, SOLUTION PARENTERAL at 18:56

## 2022-12-05 RX ADMIN — HYDRALAZINE HYDROCHLORIDE 50 MG: 50 TABLET, FILM COATED ORAL at 09:13

## 2022-12-05 NOTE — THERAPY EVALUATION
Patient Name: Dedra Marlow  : 1940    MRN: [id number]                              Today's Date: 2022       Admit Date: 2022    Visit Dx:     ICD-10-CM ICD-9-CM   1. Diabetic ketoacidosis without coma associated with type 2 diabetes mellitus (HCC)  E11.10 250.12   2. Renal insufficiency  N28.9 593.9   3. Hyponatremia  E87.1 276.1   4. Generalized weakness  R53.1 780.79     Patient Active Problem List   Diagnosis   • Diabetic ketoacidosis without coma associated with type 2 diabetes mellitus (HCC)     Past Medical History:   Diagnosis Date   • Diabetes mellitus (HCC)    • Hypertension      History reviewed. No pertinent surgical history.   General Information     Row Name 22 1106          Physical Therapy Time and Intention    Document Type evaluation  -     Mode of Treatment individual therapy;physical therapy  -     Row Name 22 110          General Information    Patient Profile Reviewed yes  -     Prior Level of Function independent:  -     Existing Precautions/Restrictions fall  -     Row Name 22 110          Living Environment    People in Home child(manjit), adult  -     Row Name 22 1106          Home Main Entrance    Number of Stairs, Main Entrance none  -     Row Name 22 110          Cognition    Orientation Status (Cognition) oriented x 4  -     Row Name 22 110          Safety Issues, Functional Mobility    Impairments Affecting Function (Mobility) strength;balance;endurance/activity tolerance  -           User Key  (r) = Recorded By, (t) = Taken By, (c) = Cosigned By    Initials Name Provider Type     Trupti Faye PT Physical Therapist               Mobility     Row Name 22 110          Bed Mobility    Bed Mobility supine-sit  -     Supine-Sit [place] (Bed Mobility) standby assist  -     Assistive Device (Bed Mobility) head of bed elevated;bed rails  -     Row Name 22 110          Sit-Stand  Transfer    Sit-Stand [place] (Transfers) contact guard  -     Assistive Device (Sit-Stand Transfers) other (see comments)  noAD  -     Row Name 12/05/22 1106          Gait/Stairs (Locomotion)    [place] Level (Gait) contact guard;minimum assist (75% patient effort)  -     Assistive Device (Gait) other (see comments)  A  -     Distance in Feet (Gait) 35ft  -     Deviations/Abnormal Patterns (Gait) ataxic;festinating/shuffling;gait speed decreased  -     [place] Level (Stairs) not tested  -     Comment, (Gait/Stairs) Gait slow and mildly ataxic with patient requiring HHA and Lee to maintain balance.  -           User Key  (r) = Recorded By, (t) = Taken By, (c) = Cosigned By    Initials Name Provider Type     Trupti Faye, JEF Physical Therapist               Obj/Interventions     Row Name 12/05/22 1107          Range of Motion Comprehensive    General Range of Motion no range of motion deficits identified  -     Row Name 12/05/22 1107          Strength Comprehensive (MMT)    General Manual Muscle Testing (MMT) Assessment lower extremity strength deficits identified  -     Comment, General Manual Muscle Testing (MMT) Assessment Generalized weakness  -     Row Name 12/05/22 1107          Balance    Balance Assessment sitting static balance;sitting dynamic balance;sit to stand dynamic balance;standing static balance;standing dynamic balance  -     Static Sitting Balance independent  -     Dynamic Sitting Balance modified independence  -     Position, Sitting Balance sitting edge of bed  -     Sit to Stand Dynamic Balance contact guard  -     Static Standing Balance contact guard  -     Dynamic Standing Balance minimal assist;contact guard  -     Position/Device Used, Standing Balance supported;other (see comments)  HHA  -     Balance Interventions sitting;standing;supported;sit to stand;static;dynamic  -           User Key  (r) = Recorded By, (t) = Taken  By, (c) = Cosigned By    Initials Name Provider Type     Trupti Faye PT Physical Therapist               Goals/Plan     Row Name 12/05/22 1112          Bed Mobility Goal 1 (PT)    Activity/Assistive Device (Bed Mobility Goal 1, PT) bed mobility activities, all  -SM     [place] Level/Cues Needed (Bed Mobility Goal 1, PT) modified independence  -SM     Time Frame (Bed Mobility Goal 1, PT) 1 week  -SM     Row Name 12/05/22 1112          Transfer Goal 1 (PT)    Activity/Assistive Device (Transfer Goal 1, PT) bed-to-chair/chair-to-bed;sit-to-stand/stand-to-sit  -SM     [place] Level/Cues Needed (Transfer Goal 1, PT) supervision required  -SM     Time Frame (Transfer Goal 1, PT) 1 week  -SM     Row Name 12/05/22 1112          Gait Training Goal 1 (PT)    Activity/Assistive Device (Gait Training Goal 1, PT) gait (walking locomotion)  -SM     [place] Level (Gait Training Goal 1, PT) standby assist  -SM     Distance (Gait Training Goal 1, PT) 100ft  -SM     Time Frame (Gait Training Goal 1, PT) 1 week  -SM           User Key  (r) = Recorded By, (t) = Taken By, (c) = Cosigned By    Initials Name Provider Type     Trupti Faye PT Physical Therapist               Clinical Impression     Row Name 12/05/22 1108          Pain    Pretreatment Pain Rating 0/10 - no pain  -SM     Posttreatment Pain Rating 0/10 - no pain  -SM     Row Name 12/05/22 1108          Plan of Care Review    Plan of Care Reviewed With patient  -     Outcome Evaluation Patient is an 82 y.o female who presents to [facility] with diabetic ketoacidosis. Patient supine in bed upon arrival. Patient speaks [place]. Patient called daughter upon PT arrival who translated and provided patient PLOF. Patient lives at home with her daughter with no RITIKA. Patient is independent at baseline and does not use an AD for ambulation. Today patient sat up on the EOB with SBA. Patient performed STS from EOB wtih CGA. Patient ambualted 35ft in room today.  Gait slow and mildly ataxic with patient requiring HHA and Lee to maintain balance at times. Patient UIC at end of session. Patient demonstrates decreased strength and balance. Patient may benefit from use of an AD to maximize safety and independence. PT recommends home with assist and HHPT at d/c pending progress. Will continue to monitor.  -     Row Name 12/05/22 1108          Therapy Assessment/Plan (PT)    Rehab Potential (PT) good, to achieve stated therapy goals  -     Criteria for Skilled Interventions Met (PT) yes  -SM     Therapy Frequency (PT) 5 times/wk  -     Row Name 12/05/22 1108          Vital Signs    O2 Delivery Pre Treatment room air  -SM     O2 Delivery Intra Treatment room air  -SM     O2 Delivery Post Treatment room air  -SM     Pre Patient Position Supine  -SM     Intra Patient Position Standing  -     Post Patient Position Sitting  -     Row Name 12/05/22 1108          Positioning and Restraints    Pre-Treatment Position in bed  -     Post Treatment Position chair  -SM     In Chair notified nsg;reclined;call light within reach;encouraged to call for assist;exit alarm on  -           User Key  (r) = Recorded By, (t) = Taken By, (c) = Cosigned By    Initials Name Provider Type     Trupti Faye, PT Physical Therapist               Outcome Measures     Row Name 12/05/22 1113          How much help from another person do you currently need...    Turning from your back to your side while in flat bed without using bedrails? 4  -SM     Moving from lying on back to sitting on the side of a flat bed without bedrails? 4  -SM     Moving to and from a bed to a chair (including a wheelchair)? 3  -SM     Standing up from a chair using your arms (e.g., wheelchair, bedside chair)? 3  -SM     Climbing 3-5 steps with a railing? 2  -SM     To walk in hospital room? 3  -SM     AM-PAC 6 Clicks Score (PT) 19  -SM     Highest level of mobility 6 --> Walked 10 steps or more  -SM           User  Key  (r) = Recorded By, (t) = Taken By, (c) = Cosigned By    Initials Name Provider Type     Trupti Faye PT Physical Therapist                             Physical Therapy Education     Title: PT OT SLP Therapies (In Progress)     Topic: Physical Therapy (In Progress)     Point: Mobility training (Done)     Learning Progress Summary           Patient Acceptance, E, VU by  at 12/5/2022 1113                   Point: Home exercise program (Not Started)     Learner Progress:  Not documented in this visit.          Point: Body mechanics (Done)     Learning Progress Summary           Patient Acceptance, E, VU by  at 12/5/2022 1113                   Point: Precautions (Done)     Learning Progress Summary           Patient Acceptance, E, VU by  at 12/5/2022 1113                               User Key     Initials Effective Dates Name Provider Type [facility] 05/02/22 -  Trupti Faye PT Physical Therapist PT              PT Recommendation and Plan     Plan of Care Reviewed With: patient  Outcome Evaluation: Patient is an 82 y.o female who presents to [facility] with diabetic ketoacidosis. Patient supine in bed upon arrival. Patient speaks Czech. Patient called daughter upon PT arrival who translated and provided patient PLOF. Patient lives at home with her daughter with no RITIKA. Patient is independent at baseline and does not use an AD for ambulation. Today patient sat up on the EOB with SBA. Patient performed STS from EOB wt CGA. Patient ambualted 35ft in room today. Gait slow and mildly ataxic with patient requiring HHA and Lee to maintain balance at times. Patient UIC at end of session. Patient demonstrates decreased strength and balance. Patient may benefit from use of an AD to maximize safety and independence. PT recommends home with assist and HHPT at d/c pending progress. Will continue to monitor.     Time Calculation:    PT Charges     Row Name 12/05/22 1114             Time Calculation     Start Time 1039  -SM      Stop Time 1053  -SM      Time Calculation (min) 14 min  -SM      PT Received On 12/05/22  -SM      PT - Next Appointment 12/06/22  -      PT Goal Re-Cert Due Date 12/12/22  -         Time Calculation- PT    Total Timed Code Minutes- PT 8 minute(s)  -SM         Timed Charges    04159 - PT Therapeutic Activity Minutes 8  -SM         Total Minutes    Timed Charges Total Minutes 8  -SM       Total Minutes 8  -SM            User Key  (r) = Recorded By, (t) = Taken By, (c) = Cosigned By    Initials Name Provider Type     Trupti Faye PT Physical Therapist              Therapy Charges for Today     Code Description Service Date Service Provider Modifiers Qty    17873860402 HC PT THERAPEUTIC ACT EA 15 MIN 12/5/2022 Trupti Faye, PT GP 1    81071357285 HC PT EVAL LOW COMPLEXITY 3 12/5/2022 Trupti Faye, PT GP 1          PT G-Codes  AM-PAC 6 Clicks Score (PT): 19  PT Discharge Summary  Anticipated Discharge Disposition (PT): home with home health, home with assist  Patient was not wearing a face mask during this therapy encounter. Therapist used appropriate personal protective equipment including mask and gloves.  Mask used was standard procedure mask. Appropriate PPE was worn during the entire therapy session. Hand hygiene was completed before and after therapy session. Patient is not in enhanced droplet precautions.     Trupti Faye PT  12/5/2022

## 2022-12-05 NOTE — PLAN OF CARE
Goal Outcome Evaluation:  Plan of Care Reviewed With: patient           Outcome Evaluation: Patient is an 82 y.o female who presents to [facility] with diabetic ketoacidosis. Patient supine in bed upon arrival. Patient speaks Pashto. Patient called daughter upon PT arrival who translated and provided patient PLOF. Patient lives at home with her daughter with no RITIKA. Patient is independent at baseline and does not use an AD for ambulation. Today patient sat up on the EOB with SBA. Patient performed STS from EOB wtih CGA. Patient ambualted 35ft in room today. Gait slow and mildly ataxic with patient requiring HHA and Lee to maintain balance at times. Patient UIC at end of session. Patient demonstrates decreased strength and balance. Patient may benefit from use of an AD to maximize safety and independence. PT recommends home with assist and HHPT at d/c pending progress. Will continue to monitor.

## 2022-12-05 NOTE — NURSING NOTE
"Diabetes Education  Assessment/Teaching    Patient Name:  Dedra Marlow  YOB: 1940  MRN: [id number]  Admit Date:  12/2/2022      Assessment Date:  12/5/2022  Flowsheet Row Most Recent Value   General Information     Referral From: MD order. Meet with 81 y/o at bedside to assess needs for DM ed.    Height 162.6 cm (64\")   Height Method Estimated   Weight 66 kg (145 lb 8.1 oz)   Weight Method Bed scale   Diabetes History    What type of diabetes do you have? Type 2   Length of Diabetes Diagnosis -- ~30 yr hx per MD note.   Do you test your blood sugar at home? yes   Who performs the test? pt   Have you had low blood sugar? (<70mg/dl) yes   Have you had high blood sugar? (>140mg/dl) yes -pt's dtr reports recent BG readings in 500's and sometimes meter reads high. a1c 12.   Education Preferences    Barriers to Learning -- cultural/language. pt's dtr at bedside interprets.   Assessment Topics    Taking Medication - Assessment Needs education   Problem Solving - Assessment Needs education re seeking tx for unremitting high BGs.    Healthy Coping - Assessment Competent -two supportive family members at bedside.   Monitoring - Assessment Competent   DM Goals    Contact Plan Follow-up medical care          Flowsheet Row Most Recent Value   DM Education Needs    Meter Has own   Medication Insulin -emphasize w/pt the importance to rotate insulin injection sites, and to avoid hardened areas (lipodystrophy.) Advise pt, family to be sure not to use insulin past expiration date or beyond one month.   Problem Solving Hyperglycemia -advise pt, family to go to ER for unremitting high BGs. discuss dka dz process and ketone testing. recommend pt have ketone sticks for dc.   Healthy Coping Appropriate   Discharge Plan Home, Follow-up with MD. dtr reports [facility] goes to [facility]. she has not had insurance.   Teaching Method Explanation, Discussion   Patient Response Verbalized understanding      Other Comments: " Addendum. Noted Medicaid pending. Dtr requesting refill for (True Metrix meter.) DM educator placed order set for new meter/supply and ketone sticks for dc. MD needs to sign. Thank you.  Electronically signed by:  Niya Vera RN, BSN, [facility]  12/05/22 15:30 EST

## 2022-12-05 NOTE — PLAN OF CARE
Goal Outcome Evaluation:      A/O x4, room air when awake, placed on 2L NC while sleeping. NSR on monitor. No complaints of pain, weakness, or n/v. Rested well through the night. Bedtime blood sugar of 130, no insulin given. VSS.

## 2022-12-05 NOTE — PROGRESS NOTES
Dr. ANAHI Arenas    [address]    12/5/2022    Patient ID:  Name:  Dedra Marlow  MRN:  [id number]  1940  82 y.o.  female            CC/Reason for visit: Diabetic ketoacidosis     Interval hx: Has no new complaints.  Had adequate morning blood sugars by omitting long-acting Lantus at nighttime dose yesterday.  The day prior she became hypoglycemic in the morning.  Received Lantus 30 only in the morning today but is having postprandial hyperglycemia up in the 300 range.  She denies any cough, abdominal pain, diarrhea or fever        ROS: No chest pain, abdominal pain    Vitals:  Vitals:    12/05/22 0100 12/05/22 0426 12/05/22 0723 12/05/22 1301   BP:   146/81 169/89   BP Location:   Left arm Right arm   Patient Position:   Lying Sitting   Pulse:   79 90   Resp:   18 18   Temp:   98.2 °F (36.8 °C) 98.2 °F (36.8 °C)   TempSrc:   Oral Oral   SpO2:  (!) 88% 100% 96%   Weight: 66 kg (145 lb 8.1 oz)      Height:               Body mass index is 24.98 kg/m².    Intake/Output Summary (Last 24 hours) at 12/5/2022 1746  Last data filed at 12/5/2022 0900  Gross per 24 hour   Intake 240 ml   Output 1150 ml   Net -910 ml       Exam:  GEN:  No distress  Alert, oriented x 3.   LUNGS: Clear breath sounds bilat, no use of accessory muscles  CV:  Normal S1S2, without murmur, no edema  ABD:  Non tender, no enlarged liver or masses      Scheduled meds:  hydrALAZINE, 50 mg, Oral, TID  insulin glargine, 30 Units, Subcutaneous, QAM  insulin lispro, 6 Units, Subcutaneous, TID With Meals  insulin regular, 0-24 Units, Subcutaneous, 4x Daily AC & at Bedtime  sodium chloride, 10 mL, Intravenous, Q12H      IV meds:                           Data Review:   I reviewed the patient's medications and new clinical results.    No results found for: COVID19      Lab Results   Component Value Date    CALCIUM 9.0 12/05/2022    PHOS 2.8 12/02/2022    MG 2.1 12/02/2022    MG  12/02/2022      Comment:      Specimen contaminated    Corrected result. Previous result was 1.5 mg/dL on 12/2/2022 at 1253 EST.    MG 2.1 12/02/2022     Results from last 7 days   Lab Units 12/05/22  0723 12/04/22  0701 12/02/22  1321 12/02/22  0410 12/01/22  2304   SODIUM mmol/L 134* 137 136   < > 121*   POTASSIUM mmol/L 4.4 4.3 5.1   < > 5.6*   CHLORIDE mmol/L 102 105 103   < > 86*   CO2 mmol/L 24.5 22.0 20.6*   < > 14.0*   BUN mg/dL 15 20 28*   < > 48*   CREATININE mg/dL 1.40* 1.56* 1.66*   < > 2.30*   CALCIUM mg/dL 9.0 9.4 8.8   < > 10.2   BILIRUBIN mg/dL  --   --   --   --  0.2   ALK PHOS U/L  --   --   --   --  95   ALT (SGPT) U/L  --   --   --   --  24   AST (SGOT) U/L  --   --   --   --  21   GLUCOSE mg/dL 108* 69 129*   < > 543*   WBC 10*3/mm3 6.22  --   --   --  8.91   HEMOGLOBIN g/dL 10.7*  --   --   --  11.3*   PLATELETS 10*3/mm3 207  --   --   --  237    < > = values in this interval not displayed.                 ASSESSMENT:     Diabetic ketoacidosis without coma associated with type 2 diabetes mellitus (HCC)  Acute kidney injury  Hyperkalemia  Hyponatremia  Anemia  Hypertension  Diabetic nephropathy      PLAN:  Brittle diabetic.  Proving difficult to control.  I had to omit nighttime Lantus dose due to morning low blood sugars in the 60s.  I have modified Lantus to just once a day dosing in the morning, and 30 units instead of 20 units.  She is still having postprandial hyperglycemia in the 300 range, a few hours after meals.  I am going to start 6 units of fast acting insulin with every meal but we will have to start checking postprandial blood glucose 2 hours after meals for the next day or 2 until we find the exact dosing and sliding scale which works best for her.  Ideally in the outpatient setting she would benefit from continuous glucose monitoring device, but she does not have medical insurance.  I explained all of this in Portuguese to the patient and her children today.  They are in agreement for us to adjust her insulin according to postprandial  glucose checks 2 hours after meals.  I anticipate discharge in the next 48 hours      Milad Arenas MD  12/5/2022

## 2022-12-06 LAB
GLUCOSE BLDC GLUCOMTR-MCNC: 208 MG/DL (ref 70–130)
GLUCOSE BLDC GLUCOMTR-MCNC: 260 MG/DL (ref 70–130)
GLUCOSE BLDC GLUCOMTR-MCNC: 320 MG/DL (ref 70–130)
GLUCOSE BLDC GLUCOMTR-MCNC: 329 MG/DL (ref 70–130)
GLUCOSE BLDC GLUCOMTR-MCNC: 43 MG/DL (ref 70–130)
GLUCOSE BLDC GLUCOMTR-MCNC: 46 MG/DL (ref 70–130)
GLUCOSE BLDC GLUCOMTR-MCNC: 74 MG/DL (ref 70–130)
GLUCOSE BLDC GLUCOMTR-MCNC: 74 MG/DL (ref 70–130)
GLUCOSE BLDC GLUCOMTR-MCNC: 76 MG/DL (ref 70–130)
GLUCOSE BLDC GLUCOMTR-MCNC: 84 MG/DL (ref 70–130)

## 2022-12-06 PROCEDURE — 82962 GLUCOSE BLOOD TEST: CPT

## 2022-12-06 PROCEDURE — 63710000001 INSULIN REGULAR HUMAN PER 5 UNITS: Performed by: INTERNAL MEDICINE

## 2022-12-06 PROCEDURE — 63710000001 INSULIN LISPRO (HUMAN) PER 5 UNITS: Performed by: INTERNAL MEDICINE

## 2022-12-06 RX ORDER — INSULIN LISPRO 100 [IU]/ML
0-14 INJECTION, SOLUTION INTRAVENOUS; SUBCUTANEOUS
Status: DISCONTINUED | OUTPATIENT
Start: 2022-12-06 | End: 2022-12-08

## 2022-12-06 RX ORDER — INSULIN LISPRO 100 [IU]/ML
0-14 INJECTION, SOLUTION INTRAVENOUS; SUBCUTANEOUS 3 TIMES DAILY
Status: DISCONTINUED | OUTPATIENT
Start: 2022-12-06 | End: 2022-12-08 | Stop reason: HOSPADM

## 2022-12-06 RX ORDER — INSULIN LISPRO 100 [IU]/ML
16 INJECTION, SOLUTION INTRAVENOUS; SUBCUTANEOUS ONCE
Status: COMPLETED | OUTPATIENT
Start: 2022-12-06 | End: 2022-12-06

## 2022-12-06 RX ADMIN — HYDRALAZINE HYDROCHLORIDE 50 MG: 50 TABLET, FILM COATED ORAL at 17:12

## 2022-12-06 RX ADMIN — INSULIN LISPRO 6 UNITS: 100 INJECTION, SOLUTION INTRAVENOUS; SUBCUTANEOUS at 12:08

## 2022-12-06 RX ADMIN — Medication 10 ML: at 20:52

## 2022-12-06 RX ADMIN — Medication 10 ML: at 09:08

## 2022-12-06 RX ADMIN — HYDRALAZINE HYDROCHLORIDE 50 MG: 50 TABLET, FILM COATED ORAL at 09:07

## 2022-12-06 RX ADMIN — HYDRALAZINE HYDROCHLORIDE 50 MG: 50 TABLET, FILM COATED ORAL at 20:48

## 2022-12-06 RX ADMIN — INSULIN LISPRO 5 UNITS: 100 INJECTION, SOLUTION INTRAVENOUS; SUBCUTANEOUS at 17:12

## 2022-12-06 RX ADMIN — INSULIN HUMAN 16 UNITS: 100 INJECTION, SOLUTION PARENTERAL at 12:08

## 2022-12-06 RX ADMIN — INSULIN LISPRO 16 UNITS: 100 INJECTION, SOLUTION INTRAVENOUS; SUBCUTANEOUS at 14:48

## 2022-12-06 RX ADMIN — INSULIN LISPRO 6 UNITS: 100 INJECTION, SOLUTION INTRAVENOUS; SUBCUTANEOUS at 17:12

## 2022-12-06 RX ADMIN — INSULIN GLARGINE-YFGN 30 UNITS: 100 INJECTION, SOLUTION SUBCUTANEOUS at 09:08

## 2022-12-06 NOTE — CASE MANAGEMENT/SOCIAL WORK
Discharge Planning Assessment  [facility]     Patient Name: Dedra Marlow  MRN: [id number]  Today's Date: 12/6/2022    Admit Date: 12/2/2022    Plan: plans to return home with daughter- CCP will follow for needs   Discharge Needs Assessment     Row Name 12/06/22 1730       Living Environment    People in Home child(manjit), adult    Name(s) of People in Home Destiny tiwari [phone]    Current Living Arrangements home    Primary Care Provided by self    Provides Primary Care For no one    Family Caregiver if Needed child(manjit), adult    Family Caregiver Names Destiny tiwari    Quality of Family Relationships helpful;involved;supportive    Able to Return to Prior Arrangements yes       Resource/Environmental Concerns    Resource/Environmental Concerns none       Transition Planning    Patient/Family Anticipates Transition to home with family    Patient/Family Anticipated Services at Transition none       Discharge Needs Assessment    Equipment Currently Used at Home glucometer    Concerns to be Addressed medication               Discharge Plan     Row Name 12/06/22 1731       Plan    Plan plans to return home with daughter- CCP will follow for needs    Patient/Family in Agreement with Plan yes    Plan Comments Spoke with patient at bedside using the  phone.  Introduced self and explained role.  Facesheet verified.  Patient lives with her daughter, Destiny Davis ( [phone]), in a single story house with no steps to enter.  Destiny works outside the home so the patient does spend time alone.  At baseline, patient is IADLS.  Her only DME is a glucometer.  Patient is self pay and goes to [facility] for her insulin and medications.  She does not have a HH or SNF history.  At [place], she plans to return home.  CCP will follow for needs. Maureen SANCHEZ RN              Continued Care and Services - Admitted Since 12/2/2022    Coordination has not been started for this encounter.       Expected  Discharge Date and Time     Expected Discharge Date Expected Discharge Time    Dec 8, 2022          Demographic Summary     Row Name 12/06/22 1720       General Information    Admission Type inpatient    Arrived From emergency department    Referral Source admission list    Reason for Consult discharge planning    Preferred Language English               Functional Status     Row Name 12/06/22 1730       Functional Status    Usual Activity Tolerance moderate    Current Activity Tolerance moderate       Functional Status, IADL    Medications independent    Meal Preparation independent    Housekeeping independent    Laundry independent    Shopping independent       Mental Status    General Appearance WDL WDL               Psychosocial    No documentation.                Abuse/Neglect    No documentation.                Legal    No documentation.                Substance Abuse    No documentation.                Patient Forms    No documentation.                   Maureen Zuniga RN

## 2022-12-06 NOTE — PROGRESS NOTES
Dr. ANAHI Arenas    [address]    12/6/2022    Patient ID:  Name:  Dedra Marlow  MRN:  [id number]  1940  82 y.o.  female            CC/Reason for visit: Diabetic ketoacidosis    Interval hx:   The patient has no complaints.  No nausea vomiting    ROS: No chest pain, abdominal pain    Vitals:  Vitals:    12/05/22 2120 12/06/22 0500 12/06/22 0715 12/06/22 1335   BP: 141/79  153/82    BP Location:   Right arm    Patient Position:   Lying    Pulse: 93  81    Resp:   16 16   Temp:   97.9 °F (36.6 °C) 98 °F (36.7 °C)   TempSrc:   Oral Oral   SpO2:   100%    Weight:  66 kg (145 lb 8.1 oz)     Height:               Body mass index is 24.98 kg/m².    Intake/Output Summary (Last 24 hours) at 12/6/2022 1420  Last data filed at 12/6/2022 1335  Gross per 24 hour   Intake 60 ml   Output 700 ml   Net -640 ml       Exam:  GEN:  No distress  Alert, oriented x 3.   LUNGS: Clear breath sounds bilat, no use of accessory muscles  CV:  Normal S1S2, without murmur, no edema  ABD:  Non tender, no enlarged liver or masses      Scheduled meds:  hydrALAZINE, 50 mg, Oral, TID  insulin glargine, 30 Units, Subcutaneous, QAM  insulin lispro, 6 Units, Subcutaneous, TID With Meals  insulin regular, 0-24 Units, Subcutaneous, 4x Daily AC & at Bedtime  sodium chloride, 10 mL, Intravenous, Q12H      IV meds:                           Data Review:   I reviewed the patient's medications and new clinical results.    No results found for: COVID19      Lab Results   Component Value Date    CALCIUM 9.0 12/05/2022    PHOS 2.8 12/02/2022    MG 2.1 12/02/2022    MG  12/02/2022      Comment:      Specimen contaminated   Corrected result. Previous result was 1.5 mg/dL on 12/2/2022 at 1253 EST.    MG 2.1 12/02/2022     Results from last 7 days   Lab Units 12/05/22  0723 12/04/22  0701 12/02/22  1321 12/02/22  0410 12/01/22  8148   SODIUM mmol/L 134* 137 136   < > 121*   POTASSIUM mmol/L 4.4 4.3 5.1   < > 5.6*   CHLORIDE mmol/L 102  105 103   < > 86*   CO2 mmol/L 24.5 22.0 20.6*   < > 14.0*   BUN mg/dL 15 20 28*   < > 48*   CREATININE mg/dL 1.40* 1.56* 1.66*   < > 2.30*   CALCIUM mg/dL 9.0 9.4 8.8   < > 10.2   BILIRUBIN mg/dL  --   --   --   --  0.2   ALK PHOS U/L  --   --   --   --  95   ALT (SGPT) U/L  --   --   --   --  24   AST (SGOT) U/L  --   --   --   --  21   GLUCOSE mg/dL 108* 69 129*   < > 543*   WBC 10*3/mm3 6.22  --   --   --  8.91   HEMOGLOBIN g/dL 10.7*  --   --   --  11.3*   PLATELETS 10*3/mm3 207  --   --   --  237    < > = values in this interval not displayed.             Results from last 7 days   Lab Units 12/01/22  2304   TROPONIN T ng/mL 0.025     Results from last 7 days   Lab Units 12/02/22  0144   MODALITY  Room Air   O2 SATURATION CALC % 55.6*       Estimated Creatinine Clearance: 28.5 mL/min (A) (by C-G formula based on SCr of 1.4 mg/dL (H)).      ASSESSMENT:     Diabetic ketoacidosis without coma associated with type 2 diabetes mellitus (HCC)        PLAN:  Having borderline low blood sugars in the morning after we stopped Lantus at night.  This morning she did not receive her scheduled 6 units with breakfast due to her blood sugar being in the 70s at 6 AM.  Subsequent postprandial blood sugar check at 10:30 in the morning showed blood sugar in the 200s but she did not receive any insulin because she does not have a sliding scale covering these as needed high blood sugar measurements.  She subsequently received scheduled 6 units short acting insulin with lunch and sliding scale coverage, but now her postprandial glucose in the middle of the afternoon is in the 300 range requiring additional high-dose sliding scale coverage.  I discussed the case face-to-face with her nurse as well as her pharmacist Lorelei.  The plan is to come up with a regimen that is simple but effective for her and also low cost.  This will be a daunting task.  She is uninsured.  We will try to come up with a sliding scale which she can use for  Premeal as well as as needed blood glucose coverage.  For now continue Lantus 30 units in the morning.  Continue 6 units lispro with every meal.  Use 1 simplified, unified sliding scale with hopefully lispro for coverage of AC high blood glucose as well as 2-hour postprandial high blood glucose values.  Hopefully we can discharge tomorrow afternoon if not on Thursday      Milad Arenas MD  12/6/2022

## 2022-12-06 NOTE — PROGRESS NOTES
Pt gets insulin from [facility], they are a 340b pharmacy thus have low cash pricing.   This is a closed pharmacy, we cannot send Rx there for her at discharge. They will only fill prescriptions from their providers.    She does have active Rx outpt for Novolog 6u TID with meals (cost is $10 per 10 mL).   Also has Rx for Lantus 20 units once daily (cost is $10 per 10mL)   She also has active Rx and refills for Truemetric test strips (100 strips is $27).      Lorelei Alfonso, PharmD, BCPS  12/6/2022 14:25 EST

## 2022-12-06 NOTE — PLAN OF CARE
Goal Outcome Evaluation:         No complaints overnight. Family at bedside. Postprandial glucose checks.  VSS. Will continue to monitor.

## 2022-12-07 LAB
GLUCOSE BLDC GLUCOMTR-MCNC: 131 MG/DL (ref 70–130)
GLUCOSE BLDC GLUCOMTR-MCNC: 140 MG/DL (ref 70–130)
GLUCOSE BLDC GLUCOMTR-MCNC: 156 MG/DL (ref 70–130)
GLUCOSE BLDC GLUCOMTR-MCNC: 171 MG/DL (ref 70–130)
GLUCOSE BLDC GLUCOMTR-MCNC: 261 MG/DL (ref 70–130)
GLUCOSE BLDC GLUCOMTR-MCNC: 274 MG/DL (ref 70–130)
GLUCOSE BLDC GLUCOMTR-MCNC: 292 MG/DL (ref 70–130)
GLUCOSE BLDC GLUCOMTR-MCNC: 95 MG/DL (ref 70–130)
GLUCOSE BLDC GLUCOMTR-MCNC: 98 MG/DL (ref 70–130)

## 2022-12-07 PROCEDURE — 97116 GAIT TRAINING THERAPY: CPT

## 2022-12-07 PROCEDURE — 82962 GLUCOSE BLOOD TEST: CPT

## 2022-12-07 PROCEDURE — 63710000001 INSULIN LISPRO (HUMAN) PER 5 UNITS: Performed by: INTERNAL MEDICINE

## 2022-12-07 RX ADMIN — Medication 10 ML: at 21:49

## 2022-12-07 RX ADMIN — HYDRALAZINE HYDROCHLORIDE 50 MG: 50 TABLET, FILM COATED ORAL at 08:22

## 2022-12-07 RX ADMIN — INSULIN LISPRO 3 UNITS: 100 INJECTION, SOLUTION INTRAVENOUS; SUBCUTANEOUS at 19:39

## 2022-12-07 RX ADMIN — INSULIN LISPRO 8 UNITS: 100 INJECTION, SOLUTION INTRAVENOUS; SUBCUTANEOUS at 12:10

## 2022-12-07 RX ADMIN — HYDRALAZINE HYDROCHLORIDE 50 MG: 50 TABLET, FILM COATED ORAL at 21:49

## 2022-12-07 RX ADMIN — INSULIN LISPRO 6 UNITS: 100 INJECTION, SOLUTION INTRAVENOUS; SUBCUTANEOUS at 12:11

## 2022-12-07 RX ADMIN — HYDRALAZINE HYDROCHLORIDE 50 MG: 50 TABLET, FILM COATED ORAL at 17:36

## 2022-12-07 RX ADMIN — INSULIN GLARGINE-YFGN 30 UNITS: 100 INJECTION, SOLUTION SUBCUTANEOUS at 08:22

## 2022-12-07 RX ADMIN — INSULIN LISPRO 8 UNITS: 100 INJECTION, SOLUTION INTRAVENOUS; SUBCUTANEOUS at 15:10

## 2022-12-07 RX ADMIN — Medication 10 ML: at 08:22

## 2022-12-07 RX ADMIN — INSULIN LISPRO 8 UNITS: 100 INJECTION, SOLUTION INTRAVENOUS; SUBCUTANEOUS at 10:32

## 2022-12-07 NOTE — THERAPY TREATMENT NOTE
Patient Name: Dedra Marlow  : 1940    MRN: [id number]                              Today's Date: 2022       Admit Date: 2022    Visit Dx:     ICD-10-CM ICD-9-CM   1. Diabetic ketoacidosis without coma associated with type 2 diabetes mellitus (HCC)  E11.10 250.12   2. Renal insufficiency  N28.9 593.9   3. Hyponatremia  E87.1 276.1   4. Generalized weakness  R53.1 780.79     Patient Active Problem List   Diagnosis   • Diabetic ketoacidosis without coma associated with type 2 diabetes mellitus (HCC)     Past Medical History:   Diagnosis Date   • Diabetes mellitus (HCC)    • Hypertension      History reviewed. No pertinent surgical history.   General Information     Row Name 22 1554          Physical Therapy Time and Intention    Document Type therapy note (daily note)  -EB     Mode of Treatment individual therapy;physical therapy  -EB     Row Name 22 1554          General Information    Existing Precautions/Restrictions fall  -EB     Row Name 22 1554          Cognition    Orientation Status (Cognition) oriented x 4  -EB     Row Name 22 1554          Safety Issues, Functional Mobility    Impairments Affecting Function (Mobility) endurance/activity tolerance;strength;balance  -EB           User Key  (r) = Recorded By, (t) = Taken By, (c) = Cosigned By    Initials Name Provider Type    EB Padmini Rincon PTA Physical Therapist Assistant               Mobility     Row Name 22 1555          Bed Mobility    Comment, (Bed Mobility) NT-UIC  -EB     Row Name 22 1555          Sit-Stand Transfer    Sit-Stand [place] (Transfers) standby assist  -EB     Assistive Device (Sit-Stand Transfers) --  No AD  -EB     Row Name 22 1555          Gait/Stairs (Locomotion)    [place] Level (Gait) contact guard  -EB     Assistive Device (Gait) walker, front-wheeled  -EB     Distance in Feet (Gait) 150ft  -EB     Deviations/Abnormal Patterns (Gait) base of support,  wide;antalgic;weight shifting decreased;gait speed decreased  -EB     Bilateral Gait Deviations forward flexed posture  -EB           User Key  (r) = Recorded By, (t) = Taken By, (c) = Cosigned By    Initials Name Provider Type    Padmini Mooney PTA Physical Therapist Assistant               Obj/Interventions    No documentation.                Goals/Plan    No documentation.                Clinical Impression     Row Name 12/07/22 7305          Plan of Care Review    Plan of Care Reviewed With patient;daughter  -EB     Progress improving  -EB     Outcome Evaluation Pt tolerated treatment with no complaints. pt's daughter present to translate. Pt is SBA with transfers. Pt ambulated 20ft without a walker and a little unsteady but pt did ambulate 150ft with rwx, CGA and was increasingly steady. No LOB. Pt needs front wheeled walker for home use at d/c. Rec Home with assist and  HHPT.  -EB     Row Name 12/07/22 9750          Therapy Assessment/Plan (PT)    Therapy Frequency (PT) 5 times/wk  -EB     Row Name 12/07/22 3106          Positioning and Restraints    Pre-Treatment Position sitting in chair/recliner  -EB     Post Treatment Position chair  -EB     In Chair sitting;call light within reach;encouraged to call for assist;with family/caregiver  -EB           User Key  (r) = Recorded By, (t) = Taken By, (c) = Cosigned By    Initials Name Provider Type    Padmini Mooney PTA Physical Therapist Assistant               Outcome Measures     Row Name 12/07/22 7705          How much help from another person do you currently need...    Turning from your back to your side while in flat bed without using bedrails? 4  -EB     Moving from lying on back to sitting on the side of a flat bed without bedrails? 4  -EB     Moving to and from a bed to a chair (including a wheelchair)? 3  -EB     Standing up from a chair using your arms (e.g., wheelchair, bedside chair)? 3  -EB     Climbing 3-5 steps with a railing? 2  -EB     To  walk in hospital room? 3  -EB     AM-PAC 6 Clicks Score (PT) 19  -EB     Highest level of mobility 6 --> Walked 10 steps or more  -           User Key  (r) = Recorded By, (t) = Taken By, (c) = Cosigned By    Initials Name Provider Type    EB Padmini Rincon PTA Physical Therapist Assistant                             Physical Therapy Education     Title: PT OT SLP Therapies (Done)     Topic: Physical Therapy (Done)     Point: Mobility training (Done)     Learning Progress Summary           Patient Acceptance, E, VU by  at 12/7/2022 1558    Acceptance, E, VU by  at 12/5/2022 1113                   Point: Home exercise program (Done)     Learning Progress Summary           Patient Acceptance, E, VU by  at 12/7/2022 1558                   Point: Body mechanics (Done)     Learning Progress Summary           Patient Acceptance, E, VU by  at 12/7/2022 1558    Acceptance, E, VU by  at 12/5/2022 1113                   Point: Precautions (Done)     Learning Progress Summary           Patient Acceptance, E, VU by  at 12/5/2022 1113                               User Key     Initials Effective Dates Name Provider Type Discipline     06/16/21 -  Padmini Rincon PTA Physical Therapist Assistant PT     05/02/22 -  Trupti Faye PT Physical Therapist PT              PT Recommendation and Plan     Plan of Care Reviewed With: patient, daughter  Progress: improving  Outcome Evaluation: Pt tolerated treatment with no complaints. pt's daughter present to translate. Pt is SBA with transfers. Pt ambulated 20ft without a walker and a little unsteady but pt did ambulate 150ft with rwx, CGA and was increasingly steady. No LOB. Pt needs front wheeled walker for home use at d/c. Rec Home with assist and  HHPT.     Time Calculation:    PT Charges     Row Name 12/07/22 1554             Time Calculation    Start Time 1448  -EB      Stop Time 1459  -EB      Time Calculation (min) 11 min  -EB      PT Received On 12/07/22  -       PT - Next Appointment 12/09/22  -EB         Time Calculation- PT    Total Timed Code Minutes- PT 11 minute(s)  -EB            User Key  (r) = Recorded By, (t) = Taken By, (c) = Cosigned By    Initials Name Provider Type    Padmini Mooney PTA Physical Therapist Assistant              Therapy Charges for Today     Code Description Service Date Service Provider Modifiers Qty    5193909 HC GAIT TRAINING EA 15 MIN 12/7/2022 Padmini Rincon PTA GP 1          PT G-Codes  AM-PAC 6 Clicks Score (PT): 19       Padmini Rincon PTA  12/7/2022

## 2022-12-07 NOTE — PLAN OF CARE
Goal Outcome Evaluation:  Plan of Care Reviewed With: patient, family        Progress: improving  Outcome Evaluation: A&Ox4. RA.  used throughout communication. ACHS, insulin given per MAR. MD asked for blood sugar checks 12/7 2300 and 12/8 0200. Up to chair x1 assist. Purewick in use. No complaints this shift. VSS.

## 2022-12-07 NOTE — PLAN OF CARE
Goal Outcome Evaluation:               Patient blood sugar 43. Apple juice and peanut butter crackers given. Last . Will recheck this AM. No complaints of pain. Currently on 2L of oxygen. Family at bedside. VSS. Will continue to monitor.

## 2022-12-07 NOTE — PROGRESS NOTES
Dr. ANAHI Arenas    [address]    12/8/2022    Patient ID:  Name:  Dedra Marlow  MRN:  [id number]  1940  82 y.o.  female            CC/Reason for visit: diabetic keto acidosis, CKD    Interval hx: still having high BG near 300 or hypoglycemia in the middle of the night. She has no new complaints  We have been tyring to adjust her insuling dosages and adding BG checks but still proving difficult to contorl    ROS: no ch pain, no SOA    Vitals:  Vitals:    12/07/22 0700 12/07/22 1428 12/07/22 1921 12/07/22 2334   BP: 147/83 158/85 143/74 153/87   BP Location: Right arm Right arm Right arm Right arm   Patient Position: Lying Sitting Sitting Lying   Pulse: 83 86 89 81   Resp: 18 18 18 18   Temp: 97.9 °F (36.6 °C) 98.5 °F (36.9 °C) 98.7 °F (37.1 °C) 98.1 °F (36.7 °C)   TempSrc: Oral Oral Oral Oral   SpO2: 98%  95% 97%   Weight:       Height:               Body mass index is 26 kg/m².    Intake/Output Summary (Last 24 hours) at 12/8/2022 0054  Last data filed at 12/7/2022 1800  Gross per 24 hour   Intake --   Output 1300 ml   Net -1300 ml       Exam:  GEN:  No distress  Alert, oriented x 3.   LUNGS: Clear breath sounds bilat, no use of accessory muscles  CV:  Normal S1S2, without murmur, no edema  ABD:  Non tender, no enlarged liver or masses      Scheduled meds:  hydrALAZINE, 50 mg, Oral, TID  insulin glargine, 30 Units, Subcutaneous, QAM  insulin lispro, 0-14 Units, Subcutaneous, TID AC  insulin lispro, 0-14 Units, Subcutaneous, TID  insulin lispro, 6 Units, Subcutaneous, TID With Meals  sodium chloride, 10 mL, Intravenous, Q12H      IV meds:                           Data Review:   I reviewed the patient's medications and new clinical results.    No results found for: COVID19      Lab Results   Component Value Date    CALCIUM 9.0 12/05/2022    PHOS 2.8 12/02/2022    MG 2.1 12/02/2022    MG  12/02/2022      Comment:      Specimen contaminated   Corrected result. Previous result was 1.5  mg/dL on 12/2/2022 at 1253 EST.    MG 2.1 12/02/2022     Results from last 7 days   Lab Units 12/05/22  0723 12/04/22  0701 12/02/22  1321 12/02/22  0410 12/01/22  2304   SODIUM mmol/L 134* 137 136   < > 121*   POTASSIUM mmol/L 4.4 4.3 5.1   < > 5.6*   CHLORIDE mmol/L 102 105 103   < > 86*   CO2 mmol/L 24.5 22.0 20.6*   < > 14.0*   BUN mg/dL 15 20 28*   < > 48*   CREATININE mg/dL 1.40* 1.56* 1.66*   < > 2.30*   CALCIUM mg/dL 9.0 9.4 8.8   < > 10.2   BILIRUBIN mg/dL  --   --   --   --  0.2   ALK PHOS U/L  --   --   --   --  95   ALT (SGPT) U/L  --   --   --   --  24   AST (SGOT) U/L  --   --   --   --  21   GLUCOSE mg/dL 108* 69 129*   < > 543*   WBC 10*3/mm3 6.22  --   --   --  8.91   HEMOGLOBIN g/dL 10.7*  --   --   --  11.3*   PLATELETS 10*3/mm3 207  --   --   --  237    < > = values in this interval not displayed.             Results from last 7 days   Lab Units 12/01/22  2304   TROPONIN T ng/mL 0.025     Results from last 7 days   Lab Units 12/02/22  0144   MODALITY  Room Air   O2 SATURATION CALC % 55.6*       Estimated Creatinine Clearance: 29 mL/min (A) (by C-G formula based on SCr of 1.4 mg/dL (H)).      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  Still having BG levels above 250 at times or hypoglycemic episodes in middle of the night  We will drop night time fixed dose of Lispro and just use moderate SSi and check BG at 11pm and 2AM  Continue Lantus 30 in AM and Lispro 6 with meals and moderate SSI AC for breakfast and lunch        Milad Arenas MD  12/8/2022

## 2022-12-08 VITALS
TEMPERATURE: 98.7 F | WEIGHT: 146.39 LBS | DIASTOLIC BLOOD PRESSURE: 89 MMHG | HEART RATE: 84 BPM | OXYGEN SATURATION: 96 % | BODY MASS INDEX: 24.99 KG/M2 | HEIGHT: 64 IN | SYSTOLIC BLOOD PRESSURE: 158 MMHG | RESPIRATION RATE: 16 BRPM

## 2022-12-08 LAB
GLUCOSE BLDC GLUCOMTR-MCNC: 119 MG/DL (ref 70–130)
GLUCOSE BLDC GLUCOMTR-MCNC: 132 MG/DL (ref 70–130)
GLUCOSE BLDC GLUCOMTR-MCNC: 133 MG/DL (ref 70–130)
GLUCOSE BLDC GLUCOMTR-MCNC: 153 MG/DL (ref 70–130)
GLUCOSE BLDC GLUCOMTR-MCNC: 202 MG/DL (ref 70–130)
GLUCOSE BLDC GLUCOMTR-MCNC: 90 MG/DL (ref 70–130)
GLUCOSE BLDC GLUCOMTR-MCNC: 96 MG/DL (ref 70–130)

## 2022-12-08 PROCEDURE — 82962 GLUCOSE BLOOD TEST: CPT

## 2022-12-08 PROCEDURE — 63710000001 INSULIN LISPRO (HUMAN) PER 5 UNITS: Performed by: INTERNAL MEDICINE

## 2022-12-08 RX ORDER — INSULIN LISPRO 100 [IU]/ML
6 INJECTION, SOLUTION INTRAVENOUS; SUBCUTANEOUS
Status: DISCONTINUED | OUTPATIENT
Start: 2022-12-08 | End: 2022-12-08 | Stop reason: HOSPADM

## 2022-12-08 RX ORDER — INSULIN ASPART 100 [IU]/ML
6 INJECTION, SOLUTION INTRAVENOUS; SUBCUTANEOUS 2 TIMES DAILY WITH MEALS
Qty: 10 ML | Refills: 12 | Status: SHIPPED | OUTPATIENT
Start: 2022-12-08

## 2022-12-08 RX ORDER — INSULIN LISPRO 100 [IU]/ML
0-9 INJECTION, SOLUTION INTRAVENOUS; SUBCUTANEOUS
Status: DISCONTINUED | OUTPATIENT
Start: 2022-12-08 | End: 2022-12-08 | Stop reason: HOSPADM

## 2022-12-08 RX ORDER — INSULIN GLARGINE 100 [IU]/ML
30 INJECTION, SOLUTION SUBCUTANEOUS
Qty: 10 ML | Refills: 12 | Status: SHIPPED | OUTPATIENT
Start: 2022-12-08

## 2022-12-08 RX ADMIN — INSULIN LISPRO 3 UNITS: 100 INJECTION, SOLUTION INTRAVENOUS; SUBCUTANEOUS at 14:46

## 2022-12-08 RX ADMIN — INSULIN GLARGINE-YFGN 30 UNITS: 100 INJECTION, SOLUTION SUBCUTANEOUS at 07:49

## 2022-12-08 RX ADMIN — INSULIN LISPRO 4 UNITS: 100 INJECTION, SOLUTION INTRAVENOUS; SUBCUTANEOUS at 12:06

## 2022-12-08 RX ADMIN — Medication 10 ML: at 09:09

## 2022-12-08 RX ADMIN — HYDRALAZINE HYDROCHLORIDE 50 MG: 50 TABLET, FILM COATED ORAL at 17:23

## 2022-12-08 RX ADMIN — INSULIN LISPRO 6 UNITS: 100 INJECTION, SOLUTION INTRAVENOUS; SUBCUTANEOUS at 12:06

## 2022-12-08 RX ADMIN — HYDRALAZINE HYDROCHLORIDE 50 MG: 50 TABLET, FILM COATED ORAL at 09:09

## 2022-12-08 RX ADMIN — INSULIN LISPRO 6 UNITS: 100 INJECTION, SOLUTION INTRAVENOUS; SUBCUTANEOUS at 07:49

## 2022-12-08 NOTE — DISCHARGE INSTRUCTIONS
Correction - Moderate Dose.  40-60 units/day total insulin dose or average weight, on oral agents   Blood glucose 150-199 mg/dL - 2 units   Blood glucose 200-249 mg/dL - 4 units   Blood glucose 250-299 mg/dL - 6 units   Blood glucose 300-349 mg/dL - 7 units   Blood glucose 350-400 mg/dL - 8 units   Blood glucose greater than 400 mg/dL - 9 units and call provider   {BKC} Caution: Look alike/sound alike drug alert{BKC}

## 2022-12-08 NOTE — DISCHARGE SUMMARY
Patient Identification:  Name: Dedra Marlow  Age: 82 y.o.  Sex: female  :  1940  MRN: [id number]  Primary Care Physician: Margoth Worrell APRN    Admit date: 2022  Discharge date and time: 2022  Discharged Condition: good    Discharge Diagnoses:    Diabetic ketoacidosis without coma associated with type 2 diabetes mellitus (HCC)  Chronic kidney disease, diabetic nephropathy, stage unknown  Hypoglycemia  Chronic anemia  Hyperkalemia    Hospital Course: Dedra Marlow presented to [facility] with significant thirst, fatigue and generalized weakness.  She was diagnosed with diabetic ketoacidosis.  She was admitted to the intensive care unit.  She is a  patient.  The interview was conducted in [place] with her and her daughter.  They stated that for the past few months she had several episodes of diabetic ketoacidosis and was admitted at different hospitals.  She tells me that despite her primary care physician trying to adjust her insulin, both short acting and long-acting recently she was unable to control her diabetes and would end up in the hospital with diabetic ketoacidosis.  She denied any symptoms or signs of infection.  Her work-up was negative for infection in the hospital.  It took us many days to finally find a regimen of long-acting and short acting that works for her without causing hypoglycemia or ongoing hyperglycemia.  We determined that Lantus long-acting single dose 30 units given in the morning was appropriate and would not cause hypoglycemia the next morning.  We also determined that short acting lispro 6 units with breakfast and lunch was necessary for adequate coverage but no predetermined lispro dosing was indicated at nighttime since that caused hypoglycemia.  We did add moderate dose sliding scale for the patient which will be printed out on a piece of paper and she will have this at home to use to guide her  insulin administration.  She has been a diabetic for 30 years and is very skilled and versed with glucometer and glucose test strips.  She knows that she will have to be testing her blood glucose 7 times a day.  She will have to do it before meals and at bedtime, as well as in between meals and right before bedtime.  She will follow-up at [facility] with her primary care physician.  Unfortunately she is not insured.  All of this was explained to her and her daughter and they expressed understanding.      Consults:   IP CONSULT TO PULMONOLOGY  IP CONSULT TO NUTRITION SERVICES  IP CONSULT TO DIABETES EDUCATOR    Significant Diagnostic Studies:   Imaging Results (Most Recent)     Procedure Component Value Units Date/Time    XR Chest 1 View [[id number]] Collected: 12/02/22 0220     Updated: 12/02/22 0223    Narrative:      SINGLE VIEW OF THE CHEST     HISTORY: DKA     COMPARISON: None available.     FINDINGS:  Heart size is within normal limits. There is tortuosity and  calcification of the aorta. Lung volumes are diminished. No pneumothorax  or pleural effusion is seen. No definite acute infiltrates are seen.       Impression:      Overall diminished lung volumes.     This report was finalized on 12/2/2022 2:20 AM by Dr. Luzmaria Malone M.D.                   TEST  RESULTS PENDING AT DISCHARGE      Discharge Exam:  Alert and oriented x 4, in NAD  Supple neck, midline trach  RRR, no m/r/g, no edema  Clear bilaterally, no wheezing, nonlabored  No clubbing or cyanosis     Disposition:  Home    Patient Instructions:      Discharge Medications      New Medications      Instructions Start Date   glucose blood test strip   Use as instructed         Changes to Medications      Instructions Start Date   Insulin Aspart 100 UNIT/ML injection  Commonly known as: novoLOG  What changed:   · when to take this  · additional instructions   6 Units, Subcutaneous, 2 Times Daily With Meals, with breakfast and lunch, not with dinner       insulin glargine 100 UNIT/ML injection  Commonly known as: LANTUS, SEMGLEE  What changed:   · how much to take  · when to take this   30 Units, Subcutaneous, Daily With Breakfast         Continue These Medications      Instructions Start Date   albuterol (5 MG/ML) 0.5% nebulizer solution  Commonly known as: PROVENTIL   2.5 mg, Nebulization, Every 6 Hours PRN      aspirin 81 MG EC tablet   81 mg, Oral, Daily      atorvastatin 10 MG tablet  Commonly known as: LIPITOR   10 mg, Oral, Nightly      hydrALAZINE 50 MG tablet  Commonly known as: APRESOLINE   50 mg, Oral, 3 Times Daily         Stop These Medications    cetirizine 10 MG tablet  Commonly known as: zyrTEC     guaiFENesin 600 MG 12 hr tablet  Commonly known as: MUCINEX     metoprolol succinate XL 50 MG 24 hr tablet  Commonly known as: TOPROL-XL     omeprazole 20 MG capsule  Commonly known as: priLOSEC             Your medication list      START taking these medications      Instructions Last Dose Given Next Dose Due   glucose blood test strip      Use as instructed          CHANGE how you take these medications      Instructions Last Dose Given Next Dose Due   Insulin Aspart 100 UNIT/ML injection  Commonly known as: novoLOG  What changed:   · when to take this  · additional instructions      Inject 6 Units under the skin into the appropriate area as directed 2 (Two) Times a Day With Meals. with breakfast and lunch, not with dinner       insulin glargine 100 UNIT/ML injection  Commonly known as: LANTUS, SEMGLSANGEETA  What changed:   · how much to take  · when to take this      Inject 30 Units under the skin into the appropriate area as directed Daily With Breakfast.          CONTINUE taking these medications      Instructions Last Dose Given Next Dose Due   albuterol (5 MG/ML) 0.5% nebulizer solution  Commonly known as: PROVENTIL      Take 2.5 mg by nebulization Every 6 (Six) Hours As Needed.       aspirin 81 MG EC tablet      Take 81 mg by mouth Daily.        atorvastatin 10 MG tablet  Commonly known as: LIPITOR      Take 10 mg by mouth Every Night.       hydrALAZINE 50 MG tablet  Commonly known as: APRESOLINE      Take 50 mg by mouth 3 (Three) Times a Day.          STOP taking these medications    cetirizine 10 MG tablet  Commonly known as: zyrTEC        guaiFENesin 600 MG 12 hr tablet  Commonly known as: MUCINEX        metoprolol succinate XL 50 MG 24 hr tablet  Commonly known as: TOPROL-XL        omeprazole 20 MG capsule  Commonly known as: priLOSEC              Where to Get Your Medications      These medications were sent to [place] [id number]    Hours: 7:00 AM-6:00 PM Mon-Fri, 8:00 AM-4:30 PM Sat-Sun (Closed 12-12:30PM) Phone: [phone]   · Insulin Aspart 100 UNIT/ML injection  · insulin glargine 100 UNIT/ML injection     You can get these medications from any pharmacy    Bring a paper prescription for each of these medications  · glucose blood test strip             Medication Reconciliation: Please see electronically completed Med Rec.    Total time spent discharging patient including evaluation, medication reconciliation, arranging follow up, and post hospitalization instructions and education to patient and family total time exceeds 30 minutes.    Signed:  Milad Arenas MD  12/8/2022  16:21 EST

## 2022-12-09 ENCOUNTER — READMISSION MANAGEMENT (OUTPATIENT)
Dept: CALL CENTER | Facility: HOSPITAL | Age: 82
End: 2022-12-09

## 2022-12-09 NOTE — OUTREACH NOTE
Prep Survey    Flowsheet Row Responses   [facility] facility patient discharged from? [place]   Is LACE score < 7 ? No   Emergency Room discharge w/ pulse ox? No   Eligibility Readm Mgmt   Discharge diagnosis Diabetic ketoacidosis without coma associated with type 2 diabetes mellitus   Does the patient have one of the following disease processes/diagnoses(primary or secondary)? Other   Does the patient have Home health ordered? No   Is there a DME ordered? No   Prep survey completed? Yes          EZEQUIEL QUESADA - Registered Nurse

## 2022-12-09 NOTE — CASE MANAGEMENT/SOCIAL WORK
Case Management Discharge Note      Final Note: Patient dc'd home via private auto. Maureen SANCHEZ RN         Selected Continued Care - Discharged on 12/8/2022 Admission date: 12/2/2022 - Discharge disposition: Home or Self Care    Destination    No services have been selected for the patient.              Durable Medical Equipment    No services have been selected for the patient.              Dialysis/Infusion    No services have been selected for the patient.              Home Medical Care    No services have been selected for the patient.              Therapy    No services have been selected for the patient.              Community Resources    No services have been selected for the patient.              Community & DME    No services have been selected for the patient.                  Transportation Services  Private: Car    Final Discharge Disposition Code: 01 - home or self-care

## 2022-12-09 NOTE — PLAN OF CARE
Goal Outcome Evaluation:  Plan of Care Reviewed With: patient, family        Progress: improving  Outcome Evaluation: Pt A&Ox's 4. Tolerating all PO meds. BS checks and SSI provided per provider's order. BS well maintianed. Up w/ assist x's 1. VSS. D/c home. Will continue to monitor.

## 2022-12-13 ENCOUNTER — READMISSION MANAGEMENT (OUTPATIENT)
Dept: CALL CENTER | Facility: HOSPITAL | Age: 82
End: 2022-12-13

## 2022-12-13 NOTE — OUTREACH NOTE
Medical Week 1 Survey    Flowsheet Row Responses   [facility] patient discharged from? [place]   Does the patient have one of the following disease processes/diagnoses(primary or secondary)? Other   Week 1 attempt successful? No   Unsuccessful attempts Attempt 1  [[place]  #[id number]]          DENISE RUEDA - Licensed Nurse

## 2022-12-20 ENCOUNTER — READMISSION MANAGEMENT (OUTPATIENT)
Dept: CALL CENTER | Facility: HOSPITAL | Age: 82
End: 2022-12-20

## 2022-12-20 NOTE — OUTREACH NOTE
Medical Week 2 Survey    Flowsheet Row Responses   [facility] patient discharged from? [place]   Does the patient have one of the following disease processes/diagnoses(primary or secondary)? Other   Week 2 attempt successful? No   Unsuccessful attempts Attempt 1          GALILEO Fernandes Registered Nurse

## 2022-12-28 ENCOUNTER — READMISSION MANAGEMENT (OUTPATIENT)
Dept: CALL CENTER | Facility: HOSPITAL | Age: 82
End: 2022-12-28

## 2022-12-28 NOTE — OUTREACH NOTE
Medical Week 3 Survey    Flowsheet Row Responses   [facility] patient discharged from? [place]   Does the patient have one of the following disease processes/diagnoses(primary or secondary)? Other   Week 3 attempt successful? No   Unsuccessful attempts Attempt 1   Revoke Decline to participate          JOSHUA PATEL - Licensed Nurse

## 2024-10-25 NOTE — PAT
PAT call complete. Education provided to the patient on the following:    - Nothing to eat after midnight the night before your procedure, water and black coffee okay up to 2 hours before arrival time.  - If diabetic and procedure is after noon: No food 8 hours prior to arrival time, and only then only clear liquids 2 hours before arrival time.   - You will need to have someone drive you home after your procedure and remain with you for 24 hours after. The  will need to remain on site during your visit.  - Please remove all jewelry, including body piercing's, and leave any valuables at home. Only bring your drivers license and insurance card on day of procedure.  - Please arrive with a full bladder to provide a pregnancy test.   - Do not wear contact lenses; wear glasses and bring your case.  - Do not use any tobacco products on morning of procedure.  - Wash with antibacterial soap (such as Dial) the night before and morning of procedure.  - Be prepared to provide your last dose of all home medications.  - Coffee and vending available on the 1st and 5th floors; no cafeteria on site.  - You will need to arrive at 0630 on 10/29/24 at [facility] located at [address]. We are located on the 5th floor.  -Please be aware that arrival times may be subject to change up until the day of surgery.   - Feel free to contact us at: [phone] with any additional questions/concerns.   Spoke with daughter who speaks English.  Patient will hold Furosemide and Lasix DOS.  Patient will hold vitamins, asa x1 week prior.  Daughter verbalized understanding.  GERARDO Goldstein RN.

## 2024-10-25 NOTE — DISCHARGE INSTRUCTIONS
POST OPERATIVE INSTRUCTIONS  EYELID SURGERY        Most procedures are performed with local anesthesia with intravenous sedation. While post-operative nausea is rare with this type of anesthesia, it is wise to start your diet by drinking clear liquids after surgery (e.g., 7-Up, Gatorade, ginger ale, etc.).  If clear liquids are tolerated well without nausea or vomiting, you may advance towards a regular diet.  Avoid “fatty foods” (eg, milk, pizza, hamburgers, etc.) on the day of surgery or as long as nausea persists.      Many eyelid procedures only require Extra Strength Tylenol for postoperative pain control.  For those patients with more extensive eyelid reconstruction, a prescription for a pain reliever is often given.  Patients may choose to take the prescribed pain reliever OR Extra Strength Tylenol, BUT NOT both together.  Unless specifically instructed, aspirin products such as Jose, Bufferin, Anacin, Excedrin, etc., should be avoided for at least one week after surgery.  This also includes Advil, Nuprin, Motrin and Ibuprofen.  Also wait one week to restart vitamins, fish oils, or herbal medications. Any other medications (except blood thinners), which you were taking prior to surgery, should be continued on their regular schedule. If you are on blood thinners, we will call you the day after your surgery to discuss when to restart.     Whenever lying down or sleeping, keep your head elevated on 2 or 3 pillows such that your head is always elevated above the level of your chest.      Apply cold compress to surgical site for 15 min every 1 hour while awake for first 3 days following surgery. Can then decrease frequency to 4-5x daily until followup. A folded washcloth soaked in ice-cold water and wrung out works well for this.  A bag of frozen peas/corn also works well as it is lightweight but molds to the eye.  Do not apply ice directly to the skin. Can also alternate between cold compresses and warm  compresses after the first 3 days.    A small tube of eye ointment should be provided after surgery.  This is to be gently applied to the stitches and/or eye twice daily.  If the eyes feel irritated, the ointment is safe to use in the eye for lubrication.  This will likely result in blurred vision but will go away once the ointment is stopped.    EXCEPTION:  If a patch is taped over the eye, do NOT apply cold compresses or ointment.  Leave the patch undisturbed.  A physician will remove the patch at your first post-operative visit.    If your surgery was done on an outpatient basis, you should receive a phone call the day after surgery to check on your progress and to arrange for a post-operative clinic appointment.  The first post-operative visit will be 1-2 weeks after surgery to check the incisions and remove sutures if necessary.  This appointment may be with Dr. Pepe, who is Dr. Lerner/Mauri's surgical fellow. A second post-operative visit six to eight weeks after surgery will assess the final surgical result.    The following problems should be reported to the office as soon as possible:  Continuous, brisk bleeding.  Please note that some oozing or drainage is common following a surgical procedure.  Do not try to clean dried blood from the surgical site.   This will likely only create more bleeding.  Temperature (fever) over 101?F.  Excessive pain at surgical site not relieved by the pain medication, especially if associated with protrusion of the eyeball.  Sudden loss of vision.  Please note that some blurriness is expected after surgery due to the ointment used around the eyes.  All patients should be able to check their vision even with eyelid swelling.      If a problem should arise or you have a question, someone from our team can be reached at any time of the day or week by calling [phone].  I hope that your surgery experience with us is a positive one.  Please contact my office with any  questions.  Sincerely,       MD Bronsno Stern MD

## 2024-10-29 ENCOUNTER — ANESTHESIA EVENT (OUTPATIENT)
Age: 84
End: 2024-10-29
Payer: MEDICAID

## 2024-10-29 ENCOUNTER — ANESTHESIA (OUTPATIENT)
Age: 84
End: 2024-10-29
Payer: MEDICAID

## 2024-10-29 ENCOUNTER — HOSPITAL ENCOUNTER (OUTPATIENT)
Age: 84
Setting detail: HOSPITAL OUTPATIENT SURGERY
Discharge: HOME | End: 2024-10-29
Attending: OPHTHALMOLOGY | Admitting: OPHTHALMOLOGY
Payer: MEDICAID

## 2024-10-29 VITALS
TEMPERATURE: 97 F | RESPIRATION RATE: 14 BRPM | HEIGHT: 66 IN | DIASTOLIC BLOOD PRESSURE: 81 MMHG | BODY MASS INDEX: 29.12 KG/M2 | WEIGHT: 181.2 LBS | SYSTOLIC BLOOD PRESSURE: 161 MMHG | OXYGEN SATURATION: 98 % | HEART RATE: 98 BPM

## 2024-10-29 LAB
GLUCOSE BLDC GLUCOMTR-MCNC: 135 MG/DL (ref 70–130)
GLUCOSE BLDC GLUCOMTR-MCNC: 144 MG/DL (ref 70–130)

## 2024-10-29 PROCEDURE — 25010000002 BUPIVACAINE (PF) 0.5 % SOLUTION 10 ML VIAL: Performed by: OPHTHALMOLOGY

## 2024-10-29 PROCEDURE — 25010000002 FENTANYL CITRATE (PF) 50 MCG/ML SOLUTION: Performed by: NURSE ANESTHETIST, CERTIFIED REGISTERED

## 2024-10-29 PROCEDURE — 25010000002 LIDOCAINE 2% SOLUTION: Performed by: NURSE ANESTHETIST, CERTIFIED REGISTERED

## 2024-10-29 PROCEDURE — 25010000002 LIDOCAINE-EPINEPHRINE (PF) 2 %-1:200000 SOLUTION 20 ML VIAL: Performed by: OPHTHALMOLOGY

## 2024-10-29 PROCEDURE — 25010000002 PROPOFOL 10 MG/ML EMULSION: Performed by: NURSE ANESTHETIST, CERTIFIED REGISTERED

## 2024-10-29 PROCEDURE — 67917 REPAIR EYELID DEFECT: CPT | Performed by: OPHTHALMOLOGY

## 2024-10-29 PROCEDURE — 25810000003 LACTATED RINGERS PER 1000 ML: Performed by: ANESTHESIOLOGY

## 2024-10-29 RX ORDER — SODIUM CHLORIDE 0.9 % (FLUSH) 0.9 %
3-10 SYRINGE (ML) INJECTION AS NEEDED
Status: DISCONTINUED | OUTPATIENT
Start: 2024-10-29 | End: 2024-10-29 | Stop reason: HOSPADM

## 2024-10-29 RX ORDER — FLUMAZENIL 0.1 MG/ML
0.2 INJECTION INTRAVENOUS AS NEEDED
Status: DISCONTINUED | OUTPATIENT
Start: 2024-10-29 | End: 2024-10-29 | Stop reason: HOSPADM

## 2024-10-29 RX ORDER — ONDANSETRON 2 MG/ML
4 INJECTION INTRAMUSCULAR; INTRAVENOUS ONCE AS NEEDED
Status: DISCONTINUED | OUTPATIENT
Start: 2024-10-29 | End: 2024-10-29 | Stop reason: HOSPADM

## 2024-10-29 RX ORDER — FENTANYL CITRATE 50 UG/ML
INJECTION, SOLUTION INTRAMUSCULAR; INTRAVENOUS AS NEEDED
Status: DISCONTINUED | OUTPATIENT
Start: 2024-10-29 | End: 2024-10-29 | Stop reason: SURG

## 2024-10-29 RX ORDER — FENTANYL CITRATE 50 UG/ML
50 INJECTION, SOLUTION INTRAMUSCULAR; INTRAVENOUS ONCE AS NEEDED
Status: DISCONTINUED | OUTPATIENT
Start: 2024-10-29 | End: 2024-10-29 | Stop reason: HOSPADM

## 2024-10-29 RX ORDER — PROMETHAZINE HYDROCHLORIDE 12.5 MG/1
25 TABLET ORAL ONCE AS NEEDED
Status: DISCONTINUED | OUTPATIENT
Start: 2024-10-29 | End: 2024-10-29 | Stop reason: HOSPADM

## 2024-10-29 RX ORDER — PROMETHAZINE HYDROCHLORIDE 25 MG/1
25 SUPPOSITORY RECTAL ONCE AS NEEDED
Status: DISCONTINUED | OUTPATIENT
Start: 2024-10-29 | End: 2024-10-29 | Stop reason: HOSPADM

## 2024-10-29 RX ORDER — MIDAZOLAM HYDROCHLORIDE 1 MG/ML
0.5 INJECTION, SOLUTION INTRAMUSCULAR; INTRAVENOUS
Status: DISCONTINUED | OUTPATIENT
Start: 2024-10-29 | End: 2024-10-29 | Stop reason: HOSPADM

## 2024-10-29 RX ORDER — DROPERIDOL 2.5 MG/ML
0.62 INJECTION, SOLUTION INTRAMUSCULAR; INTRAVENOUS
Status: DISCONTINUED | OUTPATIENT
Start: 2024-10-29 | End: 2024-10-29 | Stop reason: HOSPADM

## 2024-10-29 RX ORDER — ERYTHROMYCIN 5 MG/G
OINTMENT OPHTHALMIC 2 TIMES DAILY
Qty: 3.5 G | Refills: 0 | Status: SHIPPED | OUTPATIENT
Start: 2024-10-29

## 2024-10-29 RX ORDER — BALANCED SALT SOLUTION 6.4; .75; .48; .3; 3.9; 1.7 MG/ML; MG/ML; MG/ML; MG/ML; MG/ML; MG/ML
SOLUTION OPHTHALMIC AS NEEDED
Status: DISCONTINUED | OUTPATIENT
Start: 2024-10-29 | End: 2024-10-29 | Stop reason: HOSPADM

## 2024-10-29 RX ORDER — LABETALOL HYDROCHLORIDE 5 MG/ML
5 INJECTION, SOLUTION INTRAVENOUS
Status: DISCONTINUED | OUTPATIENT
Start: 2024-10-29 | End: 2024-10-29 | Stop reason: HOSPADM

## 2024-10-29 RX ORDER — LIDOCAINE HYDROCHLORIDE 20 MG/ML
INJECTION, SOLUTION INFILTRATION; PERINEURAL AS NEEDED
Status: DISCONTINUED | OUTPATIENT
Start: 2024-10-29 | End: 2024-10-29 | Stop reason: SURG

## 2024-10-29 RX ORDER — SODIUM CHLORIDE 0.9 % (FLUSH) 0.9 %
3 SYRINGE (ML) INJECTION EVERY 12 HOURS SCHEDULED
Status: DISCONTINUED | OUTPATIENT
Start: 2024-10-29 | End: 2024-10-29 | Stop reason: HOSPADM

## 2024-10-29 RX ORDER — HYDROMORPHONE HYDROCHLORIDE 1 MG/ML
0.25 INJECTION, SOLUTION INTRAMUSCULAR; INTRAVENOUS; SUBCUTANEOUS
Status: DISCONTINUED | OUTPATIENT
Start: 2024-10-29 | End: 2024-10-29 | Stop reason: HOSPADM

## 2024-10-29 RX ORDER — HYDROCODONE BITARTRATE AND ACETAMINOPHEN 5; 325 MG/1; MG/1
1 TABLET ORAL EVERY 4 HOURS PRN
Qty: 5 TABLET | Refills: 0 | Status: SHIPPED | OUTPATIENT
Start: 2024-10-29

## 2024-10-29 RX ORDER — HYDRALAZINE HYDROCHLORIDE 20 MG/ML
5 INJECTION INTRAMUSCULAR; INTRAVENOUS
Status: DISCONTINUED | OUTPATIENT
Start: 2024-10-29 | End: 2024-10-29 | Stop reason: HOSPADM

## 2024-10-29 RX ORDER — DIPHENHYDRAMINE HYDROCHLORIDE 50 MG/ML
12.5 INJECTION INTRAMUSCULAR; INTRAVENOUS
Status: DISCONTINUED | OUTPATIENT
Start: 2024-10-29 | End: 2024-10-29 | Stop reason: HOSPADM

## 2024-10-29 RX ORDER — HYDROCODONE BITARTRATE AND ACETAMINOPHEN 7.5; 325 MG/1; MG/1
1 TABLET ORAL EVERY 4 HOURS PRN
Status: DISCONTINUED | OUTPATIENT
Start: 2024-10-29 | End: 2024-10-29 | Stop reason: HOSPADM

## 2024-10-29 RX ORDER — SODIUM CHLORIDE, SODIUM LACTATE, POTASSIUM CHLORIDE, CALCIUM CHLORIDE 600; 310; 30; 20 MG/100ML; MG/100ML; MG/100ML; MG/100ML
9 INJECTION, SOLUTION INTRAVENOUS CONTINUOUS
Status: DISCONTINUED | OUTPATIENT
Start: 2024-10-29 | End: 2024-10-29 | Stop reason: HOSPADM

## 2024-10-29 RX ORDER — TETRACAINE HYDROCHLORIDE 5 MG/ML
SOLUTION OPHTHALMIC AS NEEDED
Status: DISCONTINUED | OUTPATIENT
Start: 2024-10-29 | End: 2024-10-29 | Stop reason: HOSPADM

## 2024-10-29 RX ORDER — ERYTHROMYCIN 5 MG/G
OINTMENT OPHTHALMIC AS NEEDED
Status: DISCONTINUED | OUTPATIENT
Start: 2024-10-29 | End: 2024-10-29 | Stop reason: HOSPADM

## 2024-10-29 RX ORDER — NALOXONE HCL 0.4 MG/ML
0.2 VIAL (ML) INJECTION AS NEEDED
Status: DISCONTINUED | OUTPATIENT
Start: 2024-10-29 | End: 2024-10-29 | Stop reason: HOSPADM

## 2024-10-29 RX ORDER — FAMOTIDINE 10 MG/ML
20 INJECTION, SOLUTION INTRAVENOUS ONCE
Status: COMPLETED | OUTPATIENT
Start: 2024-10-29 | End: 2024-10-29

## 2024-10-29 RX ORDER — PROPOFOL 10 MG/ML
VIAL (ML) INTRAVENOUS AS NEEDED
Status: DISCONTINUED | OUTPATIENT
Start: 2024-10-29 | End: 2024-10-29 | Stop reason: SURG

## 2024-10-29 RX ORDER — HYDROCODONE BITARTRATE AND ACETAMINOPHEN 5; 325 MG/1; MG/1
1 TABLET ORAL ONCE AS NEEDED
Status: DISCONTINUED | OUTPATIENT
Start: 2024-10-29 | End: 2024-10-29 | Stop reason: HOSPADM

## 2024-10-29 RX ORDER — FENTANYL CITRATE 50 UG/ML
25 INJECTION, SOLUTION INTRAMUSCULAR; INTRAVENOUS
Status: DISCONTINUED | OUTPATIENT
Start: 2024-10-29 | End: 2024-10-29 | Stop reason: HOSPADM

## 2024-10-29 RX ADMIN — FAMOTIDINE 20 MG: 10 INJECTION, SOLUTION INTRAVENOUS at 07:18

## 2024-10-29 RX ADMIN — PROPOFOL 40 MG: 10 INJECTION, EMULSION INTRAVENOUS at 09:10

## 2024-10-29 RX ADMIN — SODIUM CHLORIDE, POTASSIUM CHLORIDE, SODIUM LACTATE AND CALCIUM CHLORIDE 9 ML/HR: 600; 310; 30; 20 INJECTION, SOLUTION INTRAVENOUS at 07:14

## 2024-10-29 RX ADMIN — LIDOCAINE HYDROCHLORIDE 100 MG: 20 INJECTION, SOLUTION INFILTRATION; PERINEURAL at 09:10

## 2024-10-29 RX ADMIN — FENTANYL CITRATE 50 MCG: 50 INJECTION, SOLUTION INTRAMUSCULAR; INTRAVENOUS at 09:07

## 2024-10-29 RX ADMIN — PROPOFOL 100 MCG/KG/MIN: 10 INJECTION, EMULSION INTRAVENOUS at 09:11

## 2024-10-29 NOTE — ANESTHESIA POSTPROCEDURE EVALUATION
"Patient: Dedra Marlow    Procedure Summary       Date: 10/29/24 Room / Location: [facility] OR 03 / [facility] MAIN OR    Anesthesia Start: 0859 Anesthesia Stop: 0938    Procedures:       RIGHT LOWER LID ECTROPION REPAIR (Right: Eye)      RIGHT LOWER LID EXCISION OF LESION (Right: Eye) Diagnosis:     Surgeons: Kael Lerner MD Provider: Lorna Walton MD    Anesthesia Type: MAC ASA Status: 2            Anesthesia Type: MAC    Vitals  Vitals Value Taken Time   /91 10/29/24 0944   Temp 36.1 °C (97 °F) 10/29/24 0936   Pulse 86 10/29/24 0940   Resp 18 10/29/24 0940   SpO2 99 % 10/29/24 0940   Vitals shown include unfiled device data.        Post Anesthesia Care and Evaluation    Patient location during evaluation: bedside  Patient participation: complete - patient participated  Level of consciousness: awake  Pain management: adequate    Airway patency: patent  Anesthetic complications: No anesthetic complications  PONV Status: controlled  Cardiovascular status: acceptable  Respiratory status: acceptable  Hydration status: acceptable    Comments: /91 (BP Location: Left arm, Patient Position: Lying)   Pulse 86   Temp 36.1 °C (97 °F) (Temporal)   Resp 18   Ht 167.6 cm (66\")   Wt 82.2 kg (181 lb 3.2 oz)   SpO2 99%   BMI 29.25 kg/m²       "

## 2024-10-29 NOTE — H&P
" History & Physical       Patient: Dedra Marlow    Date of Admission: No admission date for patient encounter.    YOB: 1940    Medical Record Number: [id number]      Chief Complaints: lesion on outer corner or right eye      History of Present Illness: 84 y.o. female presents with lesion at corner of right eye and laxity of right lower eyelid. No new meds/health problems since office visit      Allergies: No Known Allergies    10 point review of systems negative, except pertaining to the HPI    Medications:   Home Medications:  No current facility-administered medications on file prior to encounter.     Current Outpatient Medications on File Prior to Encounter   Medication Sig    albuterol (PROVENTIL) (5 MG/ML) 0.5% nebulizer solution Take 2.5 mg by nebulization Every 6 (Six) Hours As Needed.    aspirin 81 MG EC tablet Take 81 mg by mouth Daily.    atorvastatin (LIPITOR) 10 MG tablet Take 10 mg by mouth Every Night.    glucose blood test strip Use as instructed    hydrALAZINE (APRESOLINE) 50 MG tablet Take 50 mg by mouth 3 (Three) Times a Day.    Insulin Aspart (novoLOG) 100 UNIT/ML injection Inject 6 Units under the skin into the appropriate area as directed 2 (Two) Times a Day With Meals. with breakfast and lunch, not with dinner    insulin glargine (LANTUS, SEMGLEE) 100 UNIT/ML injection Inject 30 Units under the skin into the appropriate area as directed Daily With Breakfast.     Current Medications:  Scheduled Meds:  Continuous Infusions:No current facility-administered medications for this encounter.    PRN Meds:.    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        Physical Exam   Constitutional: Alert, cooperative, in no acute distress    Head: Normocephalic.   Eyes:   RLL ectropion  RLL lesion  Neck: Normal range of motion.   Cardiovascular: Normal rate.    Pulmonary/Chest: Effort normal.   Neurological: Alert.   Skin: Skin is warm.   Psychiatric: Normal mood and affect.       Assessment/Plan:  The patient voiced understanding of the risks, benefits, and alternative forms of treatment that were discussed and the patient consents to proceed with right lower eyelid ectropion repair and right lower lid excision of lesion.     Antoinette Veronica MD

## 2024-10-29 NOTE — ANESTHESIA PREPROCEDURE EVALUATION
" Anesthesia Evaluation     Patient summary reviewed and Nursing notes reviewed   no history of anesthetic complications:   NPO Solid Status: > 8 hours  NPO Liquid Status: > 2 hours           Airway   Mallampati: II  TM distance: >3 FB  Neck ROM: full  Dental    (+) upper dentures    Pulmonary - negative pulmonary ROS   Cardiovascular     ECG reviewed  Rhythm: regular    (+) hypertension, hyperlipidemia    ROS comment: ABNORMAL ECG -  Sinus rhythm  Consider left ventricular hypertrophy  No Prior Tracing for Comparison      Neuro/Psych- negative ROS  GI/Hepatic/Renal/Endo    (+) diabetes mellitus type 2 using insulin    Musculoskeletal (-) negative ROS    Abdominal   (+) obese   Substance History - negative use     OB/GYN negative ob/gyn ROS         Other                      Anesthesia Plan    ASA 2     MAC     (/75 (BP Location: Left arm, Patient Position: Lying)   Pulse 94   Temp 36.7 °C (98 °F) (Oral)   Resp 18   Ht 167.6 cm (66\")   Wt 82.2 kg (181 lb 3.2 oz)   SpO2 100%   BMI 29.25 kg/m²     I have reviewed the patient's history with the patient and the chart, including all pertinent laboratory results and imaging. I have explained the risks of anesthesia including but not limited to dental damage, corneal abrasion, nerve injury, MI, stroke, and death.    )  intravenous induction     Anesthetic plan, risks, benefits, and alternatives have been provided, discussed and informed consent has been obtained with: patient.    CODE STATUS:         "

## 2025-01-27 NOTE — OP NOTE
OPERATIVE NOTE    Patient Identification:  Name: Dedra Marlow  Age: 84 y.o.  Sex: female  :  1940  MRN: [id number]                                               Preoperative diagnosis: 1.  Right lower eyelid ectropion 2.  Excision of right lower eyelid lesion measuring approximately 1 cm  Postoperative diagnosis: same  Procedure: 1.  Right lower lid ectropion repair 2.  Excision of right lower eyelid lesion 1 cm  Surgeon: Kael Lerner MD who was present and scrubbed throughout all critical portions of the operation  Assistants: None  Anesthesia: MAC local  EBL: less than 10cc  Specimens: * No orders in the log *    Description of the procedure:     The patient was taken to the operating room and placed on the table in the supine position, where anesthesia was induced. 2% lidocaine with epinephrine and 0.5% marcaine in a 1:1 fashion was injected over the surgical site, and the patient was prepped and draped in the usual manner for orbitofacial surgery.     Corneal protectors were placed in both eyes.     The right lower eyelid had a ectropion and a nodular lesion at the lateral canthus after having to undergo emergency canthotomy canthal lysis.  A marking pen was used to lobo an area for a triangular excision to shorten the lower eyelid as well as excised the 1 cm lesion.  A 15 Bard-Rodriguez blade incision was made at the right lateral canthus. Sharp dissection was carried down to the lateral orbital rim periosteum. The inferior ramus of the lateral canthal tendon was identified and severed with sharp dissection. A full-thickness en bloc excision of the lateral aspect of the tarsal plate was carried out with sharp dissection, and bleeding was controlled with electrocauterization. The cut edge of the tarsal plate was advanced to the lateral orbital rim periosteum, where it was sutured with 5-0 vicryl suture to the internal aspect of the lateral orbital rim periosteum. The skin  was closed with  Signed    5-0 fast absorbing suture.     The corneal protectors were removed and antibiotic ophthalmic ointment was placed over the surgical site.     The patient was then awakened and taken from the operating room in good condition, having tolerated the procedure well. There were no complications, and the estimated blood loss was less than 10 cc.